# Patient Record
Sex: MALE | Race: OTHER | NOT HISPANIC OR LATINO | URBAN - METROPOLITAN AREA
[De-identification: names, ages, dates, MRNs, and addresses within clinical notes are randomized per-mention and may not be internally consistent; named-entity substitution may affect disease eponyms.]

---

## 2019-04-28 ENCOUNTER — EMERGENCY (EMERGENCY)
Facility: HOSPITAL | Age: 54
LOS: 1 days | Discharge: ANOTHER HOSPITAL | End: 2019-04-28
Attending: EMERGENCY MEDICINE | Admitting: EMERGENCY MEDICINE
Payer: COMMERCIAL

## 2019-04-28 ENCOUNTER — INPATIENT (INPATIENT)
Facility: HOSPITAL | Age: 54
LOS: 1 days | Discharge: HOME | DRG: 247 | End: 2019-04-30
Attending: STUDENT IN AN ORGANIZED HEALTH CARE EDUCATION/TRAINING PROGRAM | Admitting: INTERNAL MEDICINE
Payer: COMMERCIAL

## 2019-04-28 VITALS
SYSTOLIC BLOOD PRESSURE: 185 MMHG | RESPIRATION RATE: 15 BRPM | TEMPERATURE: 98 F | HEIGHT: 65 IN | DIASTOLIC BLOOD PRESSURE: 100 MMHG | OXYGEN SATURATION: 99 % | HEART RATE: 69 BPM | WEIGHT: 184.09 LBS

## 2019-04-28 VITALS
RESPIRATION RATE: 16 BRPM | HEART RATE: 68 BPM | OXYGEN SATURATION: 98 % | SYSTOLIC BLOOD PRESSURE: 176 MMHG | DIASTOLIC BLOOD PRESSURE: 93 MMHG

## 2019-04-28 VITALS
SYSTOLIC BLOOD PRESSURE: 154 MMHG | HEIGHT: 65 IN | WEIGHT: 187.39 LBS | OXYGEN SATURATION: 99 % | HEART RATE: 72 BPM | RESPIRATION RATE: 14 BRPM | DIASTOLIC BLOOD PRESSURE: 75 MMHG

## 2019-04-28 DIAGNOSIS — R07.9 CHEST PAIN, UNSPECIFIED: ICD-10-CM

## 2019-04-28 LAB
ALBUMIN SERPL ELPH-MCNC: 4.2 G/DL — SIGNIFICANT CHANGE UP (ref 3.3–5)
ALBUMIN SERPL ELPH-MCNC: 4.8 G/DL — SIGNIFICANT CHANGE UP (ref 3.3–5)
ALP SERPL-CCNC: 81 U/L — SIGNIFICANT CHANGE UP (ref 40–120)
ALP SERPL-CCNC: 84 U/L — SIGNIFICANT CHANGE UP (ref 30–120)
ALT FLD-CCNC: 125 U/L DA — HIGH (ref 10–60)
ALT FLD-CCNC: 94 U/L — HIGH (ref 10–45)
ANION GAP SERPL CALC-SCNC: 12 MMOL/L — SIGNIFICANT CHANGE UP (ref 5–17)
ANION GAP SERPL CALC-SCNC: 15 MMOL/L — SIGNIFICANT CHANGE UP (ref 5–17)
APTT BLD: 36.4 SEC — SIGNIFICANT CHANGE UP (ref 28.5–37)
APTT BLD: >200 SEC — CRITICAL HIGH (ref 27.5–36.3)
AST SERPL-CCNC: 56 U/L — HIGH (ref 10–40)
AST SERPL-CCNC: 81 U/L — HIGH (ref 10–40)
BASOPHILS # BLD AUTO: 0.1 K/UL — SIGNIFICANT CHANGE UP (ref 0–0.2)
BASOPHILS NFR BLD AUTO: 0.6 % — SIGNIFICANT CHANGE UP (ref 0–2)
BILIRUB SERPL-MCNC: 0.2 MG/DL — SIGNIFICANT CHANGE UP (ref 0.2–1.2)
BILIRUB SERPL-MCNC: 0.3 MG/DL — SIGNIFICANT CHANGE UP (ref 0.2–1.2)
BLD GP AB SCN SERPL QL: NEGATIVE — SIGNIFICANT CHANGE UP
BUN SERPL-MCNC: 10 MG/DL — SIGNIFICANT CHANGE UP (ref 7–23)
BUN SERPL-MCNC: 9 MG/DL — SIGNIFICANT CHANGE UP (ref 7–23)
CALCIUM SERPL-MCNC: 9.6 MG/DL — SIGNIFICANT CHANGE UP (ref 8.4–10.5)
CALCIUM SERPL-MCNC: 9.6 MG/DL — SIGNIFICANT CHANGE UP (ref 8.4–10.5)
CHLORIDE SERPL-SCNC: 101 MMOL/L — SIGNIFICANT CHANGE UP (ref 96–108)
CHLORIDE SERPL-SCNC: 98 MMOL/L — SIGNIFICANT CHANGE UP (ref 96–108)
CHOLEST SERPL-MCNC: 229 MG/DL — HIGH (ref 10–199)
CK MB BLD-MCNC: 0.9 % — SIGNIFICANT CHANGE UP (ref 0–3.5)
CK MB BLD-MCNC: 5.1 % — HIGH (ref 0–3.5)
CK MB BLD-MCNC: 5.4 % — HIGH (ref 0–3.5)
CK MB BLD-MCNC: 6.3 % — HIGH (ref 0–3.5)
CK MB CFR SERPL CALC: 0.9 NG/ML — SIGNIFICANT CHANGE UP (ref 0–3.6)
CK MB CFR SERPL CALC: 25.3 NG/ML — HIGH (ref 0–6.7)
CK MB CFR SERPL CALC: 55.5 NG/ML — HIGH (ref 0–6.7)
CK MB CFR SERPL CALC: 88.6 NG/ML — HIGH (ref 0–6.7)
CK SERPL-CCNC: 1099 U/L — HIGH (ref 30–200)
CK SERPL-CCNC: 1415 U/L — HIGH (ref 30–200)
CK SERPL-CCNC: 469 U/L — HIGH (ref 30–200)
CK SERPL-CCNC: 97 U/L — SIGNIFICANT CHANGE UP (ref 39–308)
CO2 SERPL-SCNC: 23 MMOL/L — SIGNIFICANT CHANGE UP (ref 22–31)
CO2 SERPL-SCNC: 28 MMOL/L — SIGNIFICANT CHANGE UP (ref 22–31)
CREAT SERPL-MCNC: 0.89 MG/DL — SIGNIFICANT CHANGE UP (ref 0.5–1.3)
CREAT SERPL-MCNC: 0.99 MG/DL — SIGNIFICANT CHANGE UP (ref 0.5–1.3)
EOSINOPHIL # BLD AUTO: 0.1 K/UL — SIGNIFICANT CHANGE UP (ref 0–0.5)
EOSINOPHIL NFR BLD AUTO: 0.7 % — SIGNIFICANT CHANGE UP (ref 0–6)
GLUCOSE SERPL-MCNC: 147 MG/DL — HIGH (ref 70–99)
GLUCOSE SERPL-MCNC: 162 MG/DL — HIGH (ref 70–99)
HBA1C BLD-MCNC: 6.7 % — HIGH (ref 4–5.6)
HCT VFR BLD CALC: 42.8 % — SIGNIFICANT CHANGE UP (ref 39–50)
HCT VFR BLD CALC: 45 % — SIGNIFICANT CHANGE UP (ref 39–50)
HDLC SERPL-MCNC: 36 MG/DL — LOW
HGB BLD-MCNC: 14.8 G/DL — SIGNIFICANT CHANGE UP (ref 13–17)
HGB BLD-MCNC: 14.9 G/DL — SIGNIFICANT CHANGE UP (ref 13–17)
INR BLD: 1.14 RATIO — SIGNIFICANT CHANGE UP (ref 0.88–1.16)
INR BLD: 1.17 RATIO — HIGH (ref 0.88–1.16)
LIPID PNL WITH DIRECT LDL SERPL: 177 MG/DL — HIGH
LYMPHOCYTES # BLD AUTO: 31.7 % — SIGNIFICANT CHANGE UP (ref 13–44)
LYMPHOCYTES # BLD AUTO: 4.2 K/UL — HIGH (ref 1–3.3)
MAGNESIUM SERPL-MCNC: 1.9 MG/DL — SIGNIFICANT CHANGE UP (ref 1.6–2.6)
MCHC RBC-ENTMCNC: 28 PG — SIGNIFICANT CHANGE UP (ref 27–34)
MCHC RBC-ENTMCNC: 30 PG — SIGNIFICANT CHANGE UP (ref 27–34)
MCHC RBC-ENTMCNC: 32.9 GM/DL — SIGNIFICANT CHANGE UP (ref 32–36)
MCHC RBC-ENTMCNC: 35 GM/DL — SIGNIFICANT CHANGE UP (ref 32–36)
MCV RBC AUTO: 85.1 FL — SIGNIFICANT CHANGE UP (ref 80–100)
MCV RBC AUTO: 85.7 FL — SIGNIFICANT CHANGE UP (ref 80–100)
MONOCYTES # BLD AUTO: 0.7 K/UL — SIGNIFICANT CHANGE UP (ref 0–0.9)
MONOCYTES NFR BLD AUTO: 5.6 % — SIGNIFICANT CHANGE UP (ref 2–14)
NEUTROPHILS # BLD AUTO: 8.1 K/UL — HIGH (ref 1.8–7.4)
NEUTROPHILS NFR BLD AUTO: 61.3 % — SIGNIFICANT CHANGE UP (ref 43–77)
NRBC # BLD: 0 /100 WBCS — SIGNIFICANT CHANGE UP (ref 0–0)
PHOSPHATE SERPL-MCNC: 2.1 MG/DL — LOW (ref 2.5–4.5)
PLATELET # BLD AUTO: 251 K/UL — SIGNIFICANT CHANGE UP (ref 150–400)
PLATELET # BLD AUTO: 270 K/UL — SIGNIFICANT CHANGE UP (ref 150–400)
POTASSIUM SERPL-MCNC: 3.5 MMOL/L — SIGNIFICANT CHANGE UP (ref 3.5–5.3)
POTASSIUM SERPL-MCNC: 4.1 MMOL/L — SIGNIFICANT CHANGE UP (ref 3.5–5.3)
POTASSIUM SERPL-SCNC: 3.5 MMOL/L — SIGNIFICANT CHANGE UP (ref 3.5–5.3)
POTASSIUM SERPL-SCNC: 4.1 MMOL/L — SIGNIFICANT CHANGE UP (ref 3.5–5.3)
PROT SERPL-MCNC: 7.2 G/DL — SIGNIFICANT CHANGE UP (ref 6–8.3)
PROT SERPL-MCNC: 7.9 G/DL — SIGNIFICANT CHANGE UP (ref 6–8.3)
PROTHROM AB SERPL-ACNC: 12.5 SEC — SIGNIFICANT CHANGE UP (ref 10–12.9)
PROTHROM AB SERPL-ACNC: 13.4 SEC — HIGH (ref 10–12.9)
RBC # BLD: 4.99 M/UL — SIGNIFICANT CHANGE UP (ref 4.2–5.8)
RBC # BLD: 5.29 M/UL — SIGNIFICANT CHANGE UP (ref 4.2–5.8)
RBC # FLD: 12.3 % — SIGNIFICANT CHANGE UP (ref 10.3–14.5)
RBC # FLD: 12.9 % — SIGNIFICANT CHANGE UP (ref 10.3–14.5)
RH IG SCN BLD-IMP: POSITIVE — SIGNIFICANT CHANGE UP
SODIUM SERPL-SCNC: 136 MMOL/L — SIGNIFICANT CHANGE UP (ref 135–145)
SODIUM SERPL-SCNC: 141 MMOL/L — SIGNIFICANT CHANGE UP (ref 135–145)
TOTAL CHOLESTEROL/HDL RATIO MEASUREMENT: 6.4 RATIO — SIGNIFICANT CHANGE UP (ref 3.4–9.6)
TRIGL SERPL-MCNC: 80 MG/DL — SIGNIFICANT CHANGE UP (ref 10–149)
TROPONIN I SERPL-MCNC: 0.01 NG/ML — LOW (ref 0.02–0.06)
TROPONIN T, HIGH SENSITIVITY RESULT: 1777 NG/L — HIGH (ref 0–51)
TROPONIN T, HIGH SENSITIVITY RESULT: 1823 NG/L — HIGH (ref 0–51)
TROPONIN T, HIGH SENSITIVITY RESULT: 222 NG/L — HIGH (ref 0–51)
TSH SERPL-MCNC: 2.12 UIU/ML — SIGNIFICANT CHANGE UP (ref 0.27–4.2)
WBC # BLD: 13.1 K/UL — HIGH (ref 3.8–10.5)
WBC # BLD: 14.94 K/UL — HIGH (ref 3.8–10.5)
WBC # FLD AUTO: 13.1 K/UL — HIGH (ref 3.8–10.5)
WBC # FLD AUTO: 14.94 K/UL — HIGH (ref 3.8–10.5)

## 2019-04-28 PROCEDURE — 71045 X-RAY EXAM CHEST 1 VIEW: CPT | Mod: 26

## 2019-04-28 PROCEDURE — 80053 COMPREHEN METABOLIC PANEL: CPT

## 2019-04-28 PROCEDURE — 85027 COMPLETE CBC AUTOMATED: CPT

## 2019-04-28 PROCEDURE — 85610 PROTHROMBIN TIME: CPT

## 2019-04-28 PROCEDURE — 36415 COLL VENOUS BLD VENIPUNCTURE: CPT

## 2019-04-28 PROCEDURE — 82553 CREATINE MB FRACTION: CPT

## 2019-04-28 PROCEDURE — 99223 1ST HOSP IP/OBS HIGH 75: CPT | Mod: GC

## 2019-04-28 PROCEDURE — 93010 ELECTROCARDIOGRAM REPORT: CPT

## 2019-04-28 PROCEDURE — 99291 CRITICAL CARE FIRST HOUR: CPT

## 2019-04-28 PROCEDURE — 93005 ELECTROCARDIOGRAM TRACING: CPT

## 2019-04-28 PROCEDURE — 85730 THROMBOPLASTIN TIME PARTIAL: CPT

## 2019-04-28 PROCEDURE — 92941 PRQ TRLML REVSC TOT OCCL AMI: CPT | Mod: LC,GC

## 2019-04-28 PROCEDURE — 93306 TTE W/DOPPLER COMPLETE: CPT | Mod: 26

## 2019-04-28 PROCEDURE — 82550 ASSAY OF CK (CPK): CPT

## 2019-04-28 PROCEDURE — 71045 X-RAY EXAM CHEST 1 VIEW: CPT

## 2019-04-28 PROCEDURE — 84484 ASSAY OF TROPONIN QUANT: CPT

## 2019-04-28 PROCEDURE — 99152 MOD SED SAME PHYS/QHP 5/>YRS: CPT | Mod: GC

## 2019-04-28 PROCEDURE — 93458 L HRT ARTERY/VENTRICLE ANGIO: CPT | Mod: 26,59,GC

## 2019-04-28 RX ORDER — CHLORHEXIDINE GLUCONATE 213 G/1000ML
1 SOLUTION TOPICAL
Qty: 0 | Refills: 0 | Status: DISCONTINUED | OUTPATIENT
Start: 2019-04-28 | End: 2019-04-30

## 2019-04-28 RX ORDER — HEPARIN SODIUM 5000 [USP'U]/ML
INJECTION INTRAVENOUS; SUBCUTANEOUS
Qty: 25000 | Refills: 0 | Status: DISCONTINUED | OUTPATIENT
Start: 2019-04-28 | End: 2019-05-02

## 2019-04-28 RX ORDER — ASPIRIN/CALCIUM CARB/MAGNESIUM 324 MG
81 TABLET ORAL DAILY
Qty: 0 | Refills: 0 | Status: DISCONTINUED | OUTPATIENT
Start: 2019-04-28 | End: 2019-04-30

## 2019-04-28 RX ORDER — HEPARIN SODIUM 5000 [USP'U]/ML
4000 INJECTION INTRAVENOUS; SUBCUTANEOUS EVERY 6 HOURS
Qty: 0 | Refills: 0 | Status: DISCONTINUED | OUTPATIENT
Start: 2019-04-28 | End: 2019-05-02

## 2019-04-28 RX ORDER — MAGNESIUM SULFATE 500 MG/ML
2 VIAL (ML) INJECTION ONCE
Qty: 0 | Refills: 0 | Status: COMPLETED | OUTPATIENT
Start: 2019-04-28 | End: 2019-04-28

## 2019-04-28 RX ORDER — CHLORHEXIDINE GLUCONATE 213 G/1000ML
1 SOLUTION TOPICAL DAILY
Qty: 0 | Refills: 0 | Status: DISCONTINUED | OUTPATIENT
Start: 2019-04-28 | End: 2019-04-30

## 2019-04-28 RX ORDER — METOPROLOL TARTRATE 50 MG
25 TABLET ORAL
Qty: 0 | Refills: 0 | Status: DISCONTINUED | OUTPATIENT
Start: 2019-04-28 | End: 2019-04-29

## 2019-04-28 RX ORDER — ASPIRIN/CALCIUM CARB/MAGNESIUM 324 MG
1 TABLET ORAL
Qty: 0 | Refills: 0 | COMMUNITY

## 2019-04-28 RX ORDER — TICAGRELOR 90 MG/1
180 TABLET ORAL ONCE
Qty: 0 | Refills: 0 | Status: COMPLETED | OUTPATIENT
Start: 2019-04-28 | End: 2019-04-28

## 2019-04-28 RX ORDER — TICAGRELOR 90 MG/1
90 TABLET ORAL
Qty: 0 | Refills: 0 | Status: DISCONTINUED | OUTPATIENT
Start: 2019-04-28 | End: 2019-04-30

## 2019-04-28 RX ORDER — HEPARIN SODIUM 5000 [USP'U]/ML
4000 INJECTION INTRAVENOUS; SUBCUTANEOUS ONCE
Qty: 0 | Refills: 0 | Status: COMPLETED | OUTPATIENT
Start: 2019-04-28 | End: 2019-04-28

## 2019-04-28 RX ORDER — HEPARIN SODIUM 5000 [USP'U]/ML
5000 INJECTION INTRAVENOUS; SUBCUTANEOUS EVERY 8 HOURS
Qty: 0 | Refills: 0 | Status: DISCONTINUED | OUTPATIENT
Start: 2019-04-28 | End: 2019-04-30

## 2019-04-28 RX ORDER — POTASSIUM PHOSPHATE, MONOBASIC POTASSIUM PHOSPHATE, DIBASIC 236; 224 MG/ML; MG/ML
15 INJECTION, SOLUTION INTRAVENOUS ONCE
Qty: 0 | Refills: 0 | Status: COMPLETED | OUTPATIENT
Start: 2019-04-28 | End: 2019-04-28

## 2019-04-28 RX ORDER — ATORVASTATIN CALCIUM 80 MG/1
80 TABLET, FILM COATED ORAL AT BEDTIME
Qty: 0 | Refills: 0 | Status: DISCONTINUED | OUTPATIENT
Start: 2019-04-28 | End: 2019-04-30

## 2019-04-28 RX ORDER — LANOLIN ALCOHOL/MO/W.PET/CERES
5 CREAM (GRAM) TOPICAL AT BEDTIME
Qty: 0 | Refills: 0 | Status: DISCONTINUED | OUTPATIENT
Start: 2019-04-28 | End: 2019-04-30

## 2019-04-28 RX ADMIN — Medication 81 MILLIGRAM(S): at 12:51

## 2019-04-28 RX ADMIN — TICAGRELOR 180 MILLIGRAM(S): 90 TABLET ORAL at 02:45

## 2019-04-28 RX ADMIN — HEPARIN SODIUM 1000 UNIT(S)/HR: 5000 INJECTION INTRAVENOUS; SUBCUTANEOUS at 03:03

## 2019-04-28 RX ADMIN — HEPARIN SODIUM 5000 UNIT(S): 5000 INJECTION INTRAVENOUS; SUBCUTANEOUS at 21:07

## 2019-04-28 RX ADMIN — Medication 50 GRAM(S): at 07:29

## 2019-04-28 RX ADMIN — Medication 5 MILLIGRAM(S): at 21:49

## 2019-04-28 RX ADMIN — POTASSIUM PHOSPHATE, MONOBASIC POTASSIUM PHOSPHATE, DIBASIC 62.5 MILLIMOLE(S): 236; 224 INJECTION, SOLUTION INTRAVENOUS at 08:12

## 2019-04-28 RX ADMIN — ATORVASTATIN CALCIUM 80 MILLIGRAM(S): 80 TABLET, FILM COATED ORAL at 21:07

## 2019-04-28 RX ADMIN — Medication 10 MILLIGRAM(S): at 05:56

## 2019-04-28 RX ADMIN — HEPARIN SODIUM 5000 UNIT(S): 5000 INJECTION INTRAVENOUS; SUBCUTANEOUS at 12:51

## 2019-04-28 RX ADMIN — Medication 25 MILLIGRAM(S): at 17:50

## 2019-04-28 RX ADMIN — Medication 25 MILLIGRAM(S): at 05:57

## 2019-04-28 RX ADMIN — HEPARIN SODIUM 4000 UNIT(S): 5000 INJECTION INTRAVENOUS; SUBCUTANEOUS at 03:03

## 2019-04-28 RX ADMIN — Medication 10 MILLIGRAM(S): at 15:40

## 2019-04-28 RX ADMIN — TICAGRELOR 90 MILLIGRAM(S): 90 TABLET ORAL at 17:50

## 2019-04-28 NOTE — ED PROVIDER NOTE - OBJECTIVE STATEMENT
Patient c/o 2 hours of left sided chest pain. Some sweating, no SOB. No dizziness/palpitations. No history of same pain. Patient took 325 of ASA 1.5 hours ago

## 2019-04-28 NOTE — CHART NOTE - NSCHARTNOTEFT_GEN_A_CORE
CCU Transfer Note    Transfer from: CCU    Transfer to: (  ) Medicine    (  ) Telemetry     (   ) RCU        (    ) Palliative         (   ) Stroke Unit          (   ) __________________    Accepting Physician:  Signout given to:     HPI and CCU COURSE:  This is a 52 y/o M w/ a PMH significant for HTN and HLD who initially presented to the [place] ED w/ chest pain and tightness x2 hours. He had just gone out to dinner with his wife the night prior, but denies feeling or doing anything out of the ordinary. Otherwise, he has been in his usual state of health. The pain was left-sided, associated w/ diaphoresis, but denies dyspnea, palpitations. He took 325 mg ASA at home, and since he was still feeling the chest discomfort, he decided to come to the ED. His EKG showed ST elevations in leads II, III, and aVF, so he was transferred to [facility] for LHC. Found to have 99% lesion in OM2, s/p MARY placement. Also found to have 50% mLAD lesion. At time of arrival to CCU, patient was hemodynamically stable. TTE 4/28/19:  Mild segmental left ventricular systolic dysfunction. The basal -mid inferolateral wall and the inferior walll are hypokinetic.  Endocardial visualization enhanced with intravenous injection of Ultrasonic Enhancing Agent (Definity). No left ventricular thrombus. Patient was restarted on home medications and ready for downgrade in level of care with outpatient follow up.    ASSESSMENT & PLAN:   52 y/o M w/ a PMH significant for HTN and HLD who is admitted w/ STEMI s/p PCI/MARY to [facility].     #Neuro  - no acute/active issues, mentating at baseline AOx3    #CV  - s/p PCI to 2  - c/w ASA, brilinta, lipitor  - on metoprolol succinate 100 mg daily at home; will start metoprolol tartrate 25 mg BID to assess tolerance and then uptitrate  - c/w enalapril 20mg daily  - TTE with hypokinesis of inferior and inferolateral wall, mild LV systolic dysfuntion.   - f/u lipid profile    #Resp  - no acute/active issues, saturating well on RA, not tachypneic    #GI  - no acute/active issues, DASH/TLC diet    #Renal  - admission SCr at [facility] 0.99  - trend BMP; CrCl appears wnl, monitor for YUNI    #ID  - no acute/active issues    #Endo  - A1C 6.7, needs outpatient follow up    #DVT ppx: [facility]            FOR FOLLOW UP:  -c/w ASA, brilinta, lipitor, enalapril  -uptitrate metoprolol to home dose  -outpatient follow up with cardiology  -outpatient follow up with PCP for elevated A1C

## 2019-04-28 NOTE — ED PROVIDER NOTE - CHPI ED SYMPTOMS NEG
no dizziness/no fever/no nausea/no shortness of breath/no vomiting/no chills/no back pain/no cough/no syncope

## 2019-04-28 NOTE — H&P ADULT - ASSESSMENT
54 y/o M w/ a PMH significant for HTN and HLD who is admitted w/ STEMI s/p PCI to 2.     #Neuro  - no acute/active issues, mentating at baseline AOx3    #CV  - s/p PCI to 2  - c/w ASA, brilinta, lipitor  - on metoprolol succinate 100 mg daily at home; will start metoprolol tartrate 25 mg BID to assess tolerance and then uptitrate  - for his h/o HTN, he is on enalapril 20 mg daily; will start at half dose and also uptitrate as tolerated  - TTE ordered for AM  - f/u lipid profile    #Resp  - no acute/active issues, saturating well on RA, not tachypneic    #GI  - no acute/active issues, DASH/TLC diet    #Renal  - admission SCr at [facility] 0.99  - trend BMP; CrCl appears wnl, monitor for YUNI    #ID  - no acute/active issues    #Endo  - f/u A1c, TSH    #DVT ppx: [facility]    Arely Harvey MD  PGY-2, Internal Medicine  [phone]

## 2019-04-28 NOTE — H&P ADULT - NSHPPHYSICALEXAM_GEN_ALL_CORE
Vital Signs Last 24 Hrs  T(C): 36.7 (28 Apr 2019 02:32), Max: 36.7 (28 Apr 2019 02:32)  T(F): 98.1 (28 Apr 2019 02:32), Max: 98.1 (28 Apr 2019 02:32)  HR: 68 (28 Apr 2019 03:04) (65 - 69)  BP: 176/93 (28 Apr 2019 03:04) (176/93 - 185/100)  BP(mean): 114 (28 Apr 2019 03:04) (106 - 114)  RR: 16 (28 Apr 2019 03:04) (15 - 16)  SpO2: 98% (28 Apr 2019 03:04) (98% - 99%)    PHYSICAL EXAM:   GENERAL: no acute distress  HEENT: NC/AT, EOMI, neck supple, MMM  RESPIRATORY: LCTAB/L, no rhonchi, rales, or wheezing  CARDIOVASCULAR: RRR, no murmurs, gallops, rubs  ABDOMINAL: soft, non-tender, non-distended, positive bowel sounds   EXTREMITIES: no clubbing, cyanosis, or edema  NEUROLOGICAL: alert and oriented x 3, non-focal  SKIN: no rashes or lesions   PSYCH: normal affect, linear thought process  MUSCULOSKELETAL: no gross joint deformity

## 2019-04-28 NOTE — CHART NOTE - NSCHARTNOTEFT_GEN_A_CORE
Removal of Radial Band    Pulses in the right upper extremity are palpable. The patient was placed in the supine position. The insertion site was identified and the band deflated per protocol. The radial band was removed slowly. Direct pressure was not applicable.      Monitoring of the right wrists and both upper extremities including neuro-vascular checks and vital signs every 15 minutes x 4.    Complications: None

## 2019-04-28 NOTE — ED ADULT NURSE NOTE - NSIMPLEMENTINTERV_GEN_ALL_ED
Marisela Scott(Attending) Implemented All Universal Safety Interventions:  [place] to call system. Call bell, personal items and telephone within reach. Instruct patient to call for assistance. Room bathroom lighting operational. Non-slip footwear when patient is off stretcher. Physically safe environment: no spills, clutter or unnecessary equipment. Stretcher in lowest position, wheels locked, appropriate side rails in place.

## 2019-04-28 NOTE — ED PROVIDER NOTE - NSRISKOFTRANSFER_ED_A_ED
Transportation Risk (There is always a risk of traffic delays resulting in deterioration of condition.)/Death or Disability/Deterioration of Condition En Route/Increased Pain

## 2019-04-28 NOTE — H&P ADULT - HISTORY OF PRESENT ILLNESS
This is a 54 y/o M w/ a PMH significant for HTN and HLD who initially presented to the [place] ED w/ chest pain x2 hours. The pain was left-sided, associated w/ diaphoresis, but denies dyspnea, palpitations. His EKG showed ST elevations in leads II, III, and aVF, so he was transferred to [facility] for LHC. Found to have 99% lesion in OM2, which was intervened on. Also found to have 50% mLAD lesion. At time of arrival to CCU, patient denies any further chest pain, no n/v, lightheadedness, palpitations, or dyspnea. This is a 54 y/o M w/ a PMH significant for HTN and HLD who initially presented to the [place] ED w/ chest pain and tightness x2 hours. He had just gone out to dinner with his wife the night prior, but denies feeling or doing anything out of the ordinary. Otherwise, he has been in his usual state of health. The pain was left-sided, associated w/ diaphoresis, but denies dyspnea, palpitations. He took 325 mg ASA at home, and since he was still feeling the chest discomfort, he decided to come to the ED. His EKG showed ST elevations in leads II, III, and aVF, so he was transferred to [facility] for LHC. Found to have 99% lesion in OM2, which was intervened on. Also found to have 50% mLAD lesion. At time of arrival to CCU, patient denies any further chest pain, no n/v, lightheadedness, palpitations, or dyspnea.

## 2019-04-28 NOTE — H&P ADULT - ATTENDING COMMENTS
Patient presented with inferior wall STEMI, now status post PCI.    Continue dual antiplatelet therapy, high intensity statin, beta-blocker, and ACEi.    I have personally provided 30 minutes of critical care time concurrently with resident/fellow and excludes time spent on separate procedures.  I have reviewed the resident and fellow's documentation and I agree with the resident and fellow's assessments and plans of care.

## 2019-04-28 NOTE — PROGRESS NOTE ADULT - SUBJECTIVE AND OBJECTIVE BOX
====================  CCU MIDNIGHT ROUNDS  ====================    JUANCARLOS BOLA  [id number]    Patient is a 53y old  Male who presents with a chief complaint of chest pain (28 Apr 2019 04:48)    ====================  SUMMARY: 52 y/o M w/ a PMH significant for HTN and HLD who is admitted w/ STEMI s/p PCI/MARY to OM2.   ====================    ====================  NEW EVENTS: No acute events.  TTE revealed EF 60%, mild segmental LVSD, hypokinetic inferior wall + basal-mid inferolateral walls.  Denies chest pain or palpitations.  Remains hemodynamically stable.   ====================    MEDICATIONS  (STANDING):  aspirin enteric coated 81 milliGRAM(s) Oral daily  atorvastatin 80 milliGRAM(s) Oral at bedtime  chlorhexidine 2% Cloths 1 Application(s) Topical daily  chlorhexidine 4% Liquid 1 Application(s) Topical <User Schedule>  enalapril 20 milliGRAM(s) Oral daily  heparin  Injectable 5000 Unit(s) SubCutaneous every 8 hours  melatonin 5 milliGRAM(s) Oral at bedtime  metoprolol tartrate 25 milliGRAM(s) Oral two times a day  ticagrelor 90 milliGRAM(s) Oral two times a day    ====================  VITALS (Last 12 hrs):  ====================  T(C): 36.9 (04-28-19 @ 20:00), Max: 37.2 (04-28-19 @ 19:00)  T(F): 98.4 (04-28-19 @ 20:00), Max: 99 (04-28-19 @ 19:00)  HR: 69 (04-28-19 @ 22:00) (64 - 82)  BP: 143/72 (04-28-19 @ 22:00) (124/78 - 155/85)  BP(mean): 101 (04-28-19 @ 22:00) (88 - 113)  RR: 19 (04-28-19 @ 22:00) (13 - 25)  SpO2: 98% (04-28-19 @ 22:00) (97% - 99%)      I&O's Summary    27 Apr 2019 07:01  -  28 Apr 2019 07:00  --------------------------------------------------------  IN: 200 mL / OUT: 800 mL / NET: -600 mL    28 Apr 2019 07:01  -  28 Apr 2019 22:25  --------------------------------------------------------  IN: 600 mL / OUT: 2000 mL / NET: -1400 mL      ====================  NEW LABS:  ====================  04-28    136  |  98  |  9   ----------------------------<  162<H>  4.1   |  23  |  0.89    Ca    9.6      28 Apr 2019 05:20  Phos  2.1     04-28  Mg     1.9     04-28    TPro  7.2  /  Alb  4.8  /  TBili  0.3  /  DBili  x   /  AST  81<H>  /  ALT  94<H>  /  AlkPhos  81  04-28    PT/INR - ( 28 Apr 2019 05:20 )   PT: 13.4 sec;   INR: 1.17 ratio      PTT - ( 28 Apr 2019 05:20 )  PTT:>200.0 sec  Creatine Kinase, Serum: 1099 U/L <H> (04-28-19 @ 19:04)  Creatine Kinase, Serum: 1415 U/L <H> (04-28-19 @ 13:36)  Creatine Kinase, Serum: 469 U/L <H> (04-28-19 @ 05:20)  Creatine Kinase, Serum: 97 U/L (04-28-19 @ 03:10)    CKMB Units: 55.5 ng/mL (04-28 @ 19:04)  CKMB Units: 88.6 ng/mL (04-28 @ 13:36)  CKMB Units: 25.3 ng/mL (04-28 @ 05:20)  CKMB Units: 0.9 ng/mL (04-28 @ 03:10)    ====================  PLAN:  ====================  #IWSTEMI s/p MARY to OM2  - DAPT & High intensity statin   - Metoprolol 25mg po bid   - Enalapril 20mg po daily   - Trend RIK Trevino CCU NP  Beeper #[id number]  Spectra # [phone] ====================  CCU MIDNIGHT ROUNDS  ====================    JUANCARLOS BOLA  [id number]    Patient is a 53y old  Male who presents with a chief complaint of chest pain (28 Apr 2019 04:48)    ====================  SUMMARY: 52 y/o M w/ a PMH significant for HTN and HLD who is admitted w/ STEMI s/p PCI/MARY to OM2.   ====================    ====================  NEW EVENTS: No acute events.  TTE revealed EF 60%, mild segmental LVSD, hypokinetic inferior wall + basal-mid inferolateral walls.  Denies chest pain or palpitations.  Remains hemodynamically stable.  Right radial artery site is soft, no hematoma or swelling noted.   ====================    MEDICATIONS  (STANDING):  aspirin enteric coated 81 milliGRAM(s) Oral daily  atorvastatin 80 milliGRAM(s) Oral at bedtime  chlorhexidine 2% Cloths 1 Application(s) Topical daily  chlorhexidine 4% Liquid 1 Application(s) Topical <User Schedule>  enalapril 20 milliGRAM(s) Oral daily  heparin  Injectable 5000 Unit(s) SubCutaneous every 8 hours  melatonin 5 milliGRAM(s) Oral at bedtime  metoprolol tartrate 25 milliGRAM(s) Oral two times a day  ticagrelor 90 milliGRAM(s) Oral two times a day    ====================  VITALS (Last 12 hrs):  ====================  T(C): 36.9 (04-28-19 @ 20:00), Max: 37.2 (04-28-19 @ 19:00)  T(F): 98.4 (04-28-19 @ 20:00), Max: 99 (04-28-19 @ 19:00)  HR: 69 (04-28-19 @ 22:00) (64 - 82)  BP: 143/72 (04-28-19 @ 22:00) (124/78 - 155/85)  BP(mean): 101 (04-28-19 @ 22:00) (88 - 113)  RR: 19 (04-28-19 @ 22:00) (13 - 25)  SpO2: 98% (04-28-19 @ 22:00) (97% - 99%)      I&O's Summary    27 Apr 2019 07:01  -  28 Apr 2019 07:00  --------------------------------------------------------  IN: 200 mL / OUT: 800 mL / NET: -600 mL    28 Apr 2019 07:01  -  28 Apr 2019 22:25  --------------------------------------------------------  IN: 600 mL / OUT: 2000 mL / NET: -1400 mL      ====================  NEW LABS:  ====================  04-28    136  |  98  |  9   ----------------------------<  162<H>  4.1   |  23  |  0.89    Ca    9.6      28 Apr 2019 05:20  Phos  2.1     04-28  Mg     1.9     04-28    TPro  7.2  /  Alb  4.8  /  TBili  0.3  /  DBili  x   /  AST  81<H>  /  ALT  94<H>  /  AlkPhos  81  04-28    PT/INR - ( 28 Apr 2019 05:20 )   PT: 13.4 sec;   INR: 1.17 ratio      PTT - ( 28 Apr 2019 05:20 )  PTT:>200.0 sec  Creatine Kinase, Serum: 1099 U/L <H> (04-28-19 @ 19:04)  Creatine Kinase, Serum: 1415 U/L <H> (04-28-19 @ 13:36)  Creatine Kinase, Serum: 469 U/L <H> (04-28-19 @ 05:20)  Creatine Kinase, Serum: 97 U/L (04-28-19 @ 03:10)    CKMB Units: 55.5 ng/mL (04-28 @ 19:04)  CKMB Units: 88.6 ng/mL (04-28 @ 13:36)  CKMB Units: 25.3 ng/mL (04-28 @ 05:20)  CKMB Units: 0.9 ng/mL (04-28 @ 03:10)    ====================  PLAN:  ====================  #IWSTEMI s/p MARY to OM2  - DAPT & High intensity statin   - Metoprolol 25mg po bid   - Enalapril 20mg po daily   - Right radial site stable   - Trend CE      Marycruz Trevino CCU NP  Beeper #[id number]  Spectra # [phone]

## 2019-04-28 NOTE — H&P ADULT - NSHPSOCIALHISTORY_GEN_ALL_CORE
Patient lives at home w/ family. Independent of ADLs. Denies any tobacco or illicit drug use. Drinks 60 ml whisky daily. Patient lives at home w/ family. Independent of ADLs. Denies any tobacco or illicit drug use. Drinks  ml whisky daily.

## 2019-04-29 DIAGNOSIS — E11.9 TYPE 2 DIABETES MELLITUS WITHOUT COMPLICATIONS: ICD-10-CM

## 2019-04-29 DIAGNOSIS — I10 ESSENTIAL (PRIMARY) HYPERTENSION: ICD-10-CM

## 2019-04-29 DIAGNOSIS — I21.19 ST ELEVATION (STEMI) MYOCARDIAL INFARCTION INVOLVING OTHER CORONARY ARTERY OF INFERIOR WALL: ICD-10-CM

## 2019-04-29 LAB
ALBUMIN SERPL ELPH-MCNC: 4.2 G/DL — SIGNIFICANT CHANGE UP (ref 3.3–5)
ALP SERPL-CCNC: 73 U/L — SIGNIFICANT CHANGE UP (ref 40–120)
ALT FLD-CCNC: 97 U/L — HIGH (ref 10–45)
ANION GAP SERPL CALC-SCNC: 13 MMOL/L — SIGNIFICANT CHANGE UP (ref 5–17)
AST SERPL-CCNC: 122 U/L — HIGH (ref 10–40)
BASOPHILS # BLD AUTO: 0.1 K/UL — SIGNIFICANT CHANGE UP (ref 0–0.2)
BASOPHILS NFR BLD AUTO: 1 % — SIGNIFICANT CHANGE UP (ref 0–2)
BILIRUB SERPL-MCNC: 0.5 MG/DL — SIGNIFICANT CHANGE UP (ref 0.2–1.2)
BUN SERPL-MCNC: 8 MG/DL — SIGNIFICANT CHANGE UP (ref 7–23)
CALCIUM SERPL-MCNC: 9.3 MG/DL — SIGNIFICANT CHANGE UP (ref 8.4–10.5)
CHLORIDE SERPL-SCNC: 100 MMOL/L — SIGNIFICANT CHANGE UP (ref 96–108)
CK MB BLD-MCNC: 3.5 % — SIGNIFICANT CHANGE UP (ref 0–3.5)
CK MB CFR SERPL CALC: 22.7 NG/ML — HIGH (ref 0–6.7)
CK SERPL-CCNC: 651 U/L — HIGH (ref 30–200)
CO2 SERPL-SCNC: 24 MMOL/L — SIGNIFICANT CHANGE UP (ref 22–31)
CREAT SERPL-MCNC: 0.88 MG/DL — SIGNIFICANT CHANGE UP (ref 0.5–1.3)
EOSINOPHIL # BLD AUTO: 0.2 K/UL — SIGNIFICANT CHANGE UP (ref 0–0.5)
GLUCOSE SERPL-MCNC: 137 MG/DL — HIGH (ref 70–99)
HCT VFR BLD CALC: 45.4 % — SIGNIFICANT CHANGE UP (ref 39–50)
HCV AB S/CO SERPL IA: 0.06 S/CO — SIGNIFICANT CHANGE UP (ref 0–0.99)
HCV AB SERPL-IMP: SIGNIFICANT CHANGE UP
HGB BLD-MCNC: 15.2 G/DL — SIGNIFICANT CHANGE UP (ref 13–17)
LYMPHOCYTES # BLD AUTO: 5.5 K/UL — HIGH (ref 1–3.3)
LYMPHOCYTES # BLD AUTO: 50 % — HIGH (ref 13–44)
MAGNESIUM SERPL-MCNC: 2.3 MG/DL — SIGNIFICANT CHANGE UP (ref 1.6–2.6)
MCHC RBC-ENTMCNC: 28.4 PG — SIGNIFICANT CHANGE UP (ref 27–34)
MCHC RBC-ENTMCNC: 33.4 GM/DL — SIGNIFICANT CHANGE UP (ref 32–36)
MCV RBC AUTO: 84.9 FL — SIGNIFICANT CHANGE UP (ref 80–100)
MONOCYTES # BLD AUTO: 1.1 K/UL — HIGH (ref 0–0.9)
MONOCYTES NFR BLD AUTO: 8 % — SIGNIFICANT CHANGE UP (ref 2–14)
NEUTROPHILS # BLD AUTO: 5.2 K/UL — SIGNIFICANT CHANGE UP (ref 1.8–7.4)
NEUTROPHILS NFR BLD AUTO: 41 % — LOW (ref 43–77)
PHOSPHATE SERPL-MCNC: 3.1 MG/DL — SIGNIFICANT CHANGE UP (ref 2.5–4.5)
PLATELET # BLD AUTO: 290 K/UL — SIGNIFICANT CHANGE UP (ref 150–400)
POTASSIUM SERPL-MCNC: 4.1 MMOL/L — SIGNIFICANT CHANGE UP (ref 3.5–5.3)
POTASSIUM SERPL-SCNC: 4.1 MMOL/L — SIGNIFICANT CHANGE UP (ref 3.5–5.3)
PROT SERPL-MCNC: 7.1 G/DL — SIGNIFICANT CHANGE UP (ref 6–8.3)
RBC # BLD: 5.35 M/UL — SIGNIFICANT CHANGE UP (ref 4.2–5.8)
RBC # FLD: 12.3 % — SIGNIFICANT CHANGE UP (ref 10.3–14.5)
SODIUM SERPL-SCNC: 137 MMOL/L — SIGNIFICANT CHANGE UP (ref 135–145)
TROPONIN T, HIGH SENSITIVITY RESULT: 1003 NG/L — HIGH (ref 0–51)
WBC # BLD: 12.2 K/UL — HIGH (ref 3.8–10.5)
WBC # FLD AUTO: 12.2 K/UL — HIGH (ref 3.8–10.5)

## 2019-04-29 PROCEDURE — 93010 ELECTROCARDIOGRAM REPORT: CPT

## 2019-04-29 PROCEDURE — 99233 SBSQ HOSP IP/OBS HIGH 50: CPT | Mod: GC

## 2019-04-29 RX ORDER — METOPROLOL TARTRATE 50 MG
50 TABLET ORAL
Qty: 0 | Refills: 0 | Status: DISCONTINUED | OUTPATIENT
Start: 2019-04-29 | End: 2019-04-29

## 2019-04-29 RX ORDER — METOPROLOL TARTRATE 50 MG
50 TABLET ORAL
Qty: 0 | Refills: 0 | Status: DISCONTINUED | OUTPATIENT
Start: 2019-04-29 | End: 2019-04-30

## 2019-04-29 RX ORDER — METOPROLOL TARTRATE 50 MG
25 TABLET ORAL ONCE
Qty: 0 | Refills: 0 | Status: COMPLETED | OUTPATIENT
Start: 2019-04-29 | End: 2019-04-29

## 2019-04-29 RX ADMIN — HEPARIN SODIUM 5000 UNIT(S): 5000 INJECTION INTRAVENOUS; SUBCUTANEOUS at 05:41

## 2019-04-29 RX ADMIN — TICAGRELOR 90 MILLIGRAM(S): 90 TABLET ORAL at 05:37

## 2019-04-29 RX ADMIN — HEPARIN SODIUM 5000 UNIT(S): 5000 INJECTION INTRAVENOUS; SUBCUTANEOUS at 15:09

## 2019-04-29 RX ADMIN — TICAGRELOR 90 MILLIGRAM(S): 90 TABLET ORAL at 17:23

## 2019-04-29 RX ADMIN — ATORVASTATIN CALCIUM 80 MILLIGRAM(S): 80 TABLET, FILM COATED ORAL at 21:15

## 2019-04-29 RX ADMIN — Medication 50 MILLIGRAM(S): at 21:15

## 2019-04-29 RX ADMIN — Medication 25 MILLIGRAM(S): at 17:23

## 2019-04-29 RX ADMIN — HEPARIN SODIUM 5000 UNIT(S): 5000 INJECTION INTRAVENOUS; SUBCUTANEOUS at 21:15

## 2019-04-29 RX ADMIN — Medication 5 MILLIGRAM(S): at 21:15

## 2019-04-29 RX ADMIN — Medication 81 MILLIGRAM(S): at 11:12

## 2019-04-29 RX ADMIN — Medication 25 MILLIGRAM(S): at 18:43

## 2019-04-29 RX ADMIN — CHLORHEXIDINE GLUCONATE 1 APPLICATION(S): 213 SOLUTION TOPICAL at 06:35

## 2019-04-29 RX ADMIN — Medication 20 MILLIGRAM(S): at 21:15

## 2019-04-29 RX ADMIN — Medication 20 MILLIGRAM(S): at 05:37

## 2019-04-29 RX ADMIN — Medication 25 MILLIGRAM(S): at 05:37

## 2019-04-29 NOTE — PROGRESS NOTE ADULT - ASSESSMENT
52 y/o M w/ a PMH significant for HTN and HLD who is admitted w/ STEMI s/p PCI/MARY to OM2.     Neuro  - no issues, AOx3    CV - IWSTEMI  - s/p PCI MARY to OM2  - c/w ASA, brilinta, lipitor  - on metoprolol tartrate 25 mg BID   - c/w enalapril 20mg daily  - monitor access site  - CE down trended  - increase activity    Resp  - no issues, SpO2 95-98 on RA    GI  - no acute/active issues, DASH/TLC Ovo vegan, consistent carbs diet    Renal  - no issues creat stable 0.88    ID  - no  issues    Endo  - A1C 6.7, will need outpatient follow up  - glucose stable 137      DVT ppx: HSQ 54 y/o M w/ a PMH significant for HTN and HLD who is admitted w/ STEMI s/p PCI/MARY to OM2.     Neuro  - no issues, AOx3    CV - IWSTEMI  - s/p PCI MARY to OM2  - c/w ASA, brilinta, lipitor  - on metoprolol tartrate 25 mg BID   - c/w enalapril 20mg daily  - monitor access site  - CE down trended  - increase activity    Resp  - no issues, SpO2 95-98 on RA    GI  - no acute/active issues, DASH/TLC Ovo vegan, consistent carbs diet    Renal  - no issues creat stable 0.88    ID  - no  issues    Endo  - A1C 6.7, will need outpatient follow up  - glucose stable 137  - diabetes education       DVT ppx: HSQ

## 2019-04-29 NOTE — PROGRESS NOTE ADULT - SUBJECTIVE AND OBJECTIVE BOX
====================  CCU MIDNIGHT ROUNDS  ====================    JUANCARLOS PLAZA  [id number]    Patient is a 53y old  Male who presents with a chief complaint of chest pain (29 Apr 2019 07:19)    ====================  SUMMARY: 54 y/o M w/ a PMH significant for HTN and HLD who is admitted w/ STEMI s/p PCI/MARY to OM2.  ====================    ====================  NEW EVENTS: No acute events   ====================    MEDICATIONS  (STANDING):  aspirin enteric coated 81 milliGRAM(s) Oral daily  atorvastatin 80 milliGRAM(s) Oral at bedtime  chlorhexidine 2% Cloths 1 Application(s) Topical daily  chlorhexidine 4% Liquid 1 Application(s) Topical <User Schedule>  heparin  Injectable 5000 Unit(s) SubCutaneous every 8 hours  melatonin 5 milliGRAM(s) Oral at bedtime  metoprolol tartrate 50 milliGRAM(s) Oral two times a day  ticagrelor 90 milliGRAM(s) Oral two times a day    ====================  VITALS (Last 12 hrs):  ====================  T(C): 36.7 (04-29-19 @ 21:00), Max: 36.7 (04-29-19 @ 15:00)  T(F): 98.1 (04-29-19 @ 21:00), Max: 98.1 (04-29-19 @ 15:00)  HR: 65 (04-29-19 @ 23:00) (65 - 85)  BP: 143/75 (04-29-19 @ 23:00) (124/71 - 186/91)  BP(mean): 102 (04-29-19 @ 23:00) (90 - 130)  RR: 16 (04-29-19 @ 23:00) (16 - 24)  SpO2: 97% (04-29-19 @ 19:00) (97% - 99%)      I&O's Summary    28 Apr 2019 07:01  -  29 Apr 2019 07:00  --------------------------------------------------------  IN: 720 mL / OUT: 2650 mL / NET: -1930 mL    29 Apr 2019 07:01  -  29 Apr 2019 23:56  --------------------------------------------------------  IN: 240 mL / OUT: 800 mL / NET: -560 mL        ====================  NEW LABS:  ====================  04-29    137  |  100  |  8   ----------------------------<  137<H>  4.1   |  24  |  0.88    Ca    9.3      29 Apr 2019 05:45  Phos  3.1     04-29  Mg     2.3     04-29    TPro  7.1  /  Alb  4.2  /  TBili  0.5  /  DBili  x   /  AST  122<H>  /  ALT  97<H>  /  AlkPhos  73  04-29    PT/INR - ( 28 Apr 2019 05:20 )   PT: 13.4 sec;   INR: 1.17 ratio      PTT - ( 28 Apr 2019 05:20 )  PTT:>200.0 sec  Creatine Kinase, Serum: 651 U/L <H> (04-29-19 @ 05:45)    CKMB Units: 22.7 ng/mL (04-29 @ 05:45)    ====================  PLAN:  ====================  #IWSTEMI s/p MARY to OM2  - DAPT & High intensity statin   - Metoprolol 50mg po bid   - Enalapril 40mg po daily   - Right radial site stable   - CE trended down   - DC planning       Marycruz Trevino CCU NP  Beeper #[id number]  Spectra # [phone]

## 2019-04-29 NOTE — PROGRESS NOTE ADULT - SUBJECTIVE AND OBJECTIVE BOX
Admission date: April 28th  CHIEF COMPLAINT:  HPI: 54 y/o M w/ a PMH significant for HTN and HLD who initially presented to the [place] ED w/ chest pain and tightness x2 hours. He had just gone out to dinner with his wife the night prior, but denies feeling or doing anything out of the ordinary. Otherwise, he has been in his usual state of health. The pain was left-sided, associated w/ diaphoresis, but denies dyspnea, palpitations. He took 325 mg ASA at home, and since he was still feeling the chest discomfort, he decided to come to the ED. His EKG showed ST elevations in leads II, III, and aVF, so he was transferred to [facility] for LHC. Found to have 99% lesion in OM2, which was intervened on. Also found to have 50% mLAD lesion. At time of arrival to CCU, patient denies any further chest pain, no n/v, lightheadedness, palpitations, or dyspnea. (28 Apr 2019 04:48)     INTERVAL HISTORY: S/P PCI    REVIEW OF SYSTEMS: Denies ; all others negative    MEDICATIONS  (STANDING):  aspirin enteric coated 81 milliGRAM(s) Oral daily  atorvastatin 80 milliGRAM(s) Oral at bedtime  chlorhexidine 2% Cloths 1 Application(s) Topical daily  chlorhexidine 4% Liquid 1 Application(s) Topical <User Schedule>  enalapril 20 milliGRAM(s) Oral daily  heparin  Injectable 5000 Unit(s) SubCutaneous every 8 hours  melatonin 5 milliGRAM(s) Oral at bedtime  metoprolol tartrate 25 milliGRAM(s) Oral two times a day  ticagrelor 90 milliGRAM(s) Oral two times a day    MEDICATIONS  (PRN):      Objective:  ICU Vital Signs Last 24 Hrs  T(C): 36.9 (28 Apr 2019 20:00), Max: 37.2 (28 Apr 2019 19:00)  T(F): 98.4 (28 Apr 2019 20:00), Max: 99 (28 Apr 2019 19:00)  HR: 63 (29 Apr 2019 07:00) (63 - 82)  BP: 133/85 (29 Apr 2019 07:00) (105/60 - 159/72)  BP(mean): 105 (29 Apr 2019 07:00) (77 - 117)  ABP: --  ABP(mean): --  RR: 16 (29 Apr 2019 07:00) (13 - 26)  SpO2: 98% (29 Apr 2019 06:00) (95% - 100%)          04-28 @ 07:01  -  04-29 @ 07:00  --------------------------------------------------------  IN: 720 mL / OUT: 2650 mL / NET: -1930 mL      Daily     Daily     PHYSICAL EXAM:  Constitutional:  HEENT:  Respiratory:  Cardiovascular:  Access site:  Gastrointestinal:  Genitourinary:  Extremities:  Vascular:  Neurological:  Skin:      TELEMETRY:  SR rare PVC no events    EKG:       Labs:                          15.2   12.2  )-----------( 290      ( 29 Apr 2019 05:45 )             45.4     04-29    137  |  100  |  8   ----------------------------<  137<H>  4.1   |  24  |  0.88    Ca    9.3      29 Apr 2019 05:45  Phos  3.1     04-29  Mg     2.3     04-29    TPro  7.1  /  Alb  4.2  /  TBili  0.5  /  DBili  x   /  AST  122<H>  /  ALT  97<H>  /  AlkPhos  73  04-29    LIVER FUNCTIONS - ( 29 Apr 2019 05:45 )  Alb: 4.2 g/dL / Pro: 7.1 g/dL / ALK PHOS: 73 U/L / ALT: 97 U/L / AST: 122 U/L / GGT: x           PT/INR - ( 28 Apr 2019 05:20 )   PT: 13.4 sec;   INR: 1.17 ratio         PTT - ( 28 Apr 2019 05:20 )  PTT:>200.0 sec  Creatine Kinase, Serum: 651 U/L (04-29 @ 05:45)  CKMB Units: 22.7 ng/mL (04-29 @ 05:45)  CKMB Units: 55.5 ng/mL (04-28 @ 19:04)  Creatine Kinase, Serum: 1099 U/L (04-28 @ 19:04)  Creatine Kinase, Serum: 1415 U/L (04-28 @ 13:36)  CKMB Units: 88.6 ng/mL (04-28 @ 13:36)        HEALTH ISSUES - PROBLEM Dx: Admission date: April 28th  CHIEF COMPLAINT:  HPI: 52 y/o M w/ a PMH significant for HTN and HLD who initially presented to the [place] ED w/ chest pain and tightness x2 hours. He had just gone out to dinner with his wife the night prior, but denies feeling or doing anything out of the ordinary. Otherwise, he has been in his usual state of health. The pain was left-sided, associated w/ diaphoresis, but denies dyspnea, palpitations. He took 325 mg ASA at home, and since he was still feeling the chest discomfort, he decided to come to the ED. His EKG showed ST elevations in leads II, III, and aVF, so he was transferred to [facility] for LHC. Found to have 99% lesion in OM2, which was intervened on. Also found to have 50% mLAD lesion. At time of arrival to CCU, patient denies any further chest pain, no n/v, lightheadedness, palpitations, or dyspnea. (28 Apr 2019 04:48)     INTERVAL HISTORY: S/P PCI RRA - MARY x 1 to 99% OM2  Resting comfortably in bed; no complaints offered    REVIEW OF SYSTEMS: Denies chest pain, palpitations, SOB, dizziness, headache, NV; all others negative    MEDICATIONS  (STANDING):  aspirin enteric coated 81 milliGRAM(s) Oral daily  atorvastatin 80 milliGRAM(s) Oral at bedtime  chlorhexidine 2% Cloths 1 Application(s) Topical daily  chlorhexidine 4% Liquid 1 Application(s) Topical <User Schedule>  enalapril 20 milliGRAM(s) Oral daily  heparin  Injectable 5000 Unit(s) SubCutaneous every 8 hours  melatonin 5 milliGRAM(s) Oral at bedtime  metoprolol tartrate 25 milliGRAM(s) Oral two times a day  ticagrelor 90 milliGRAM(s) Oral two times a day    MEDICATIONS  (PRN):      Objective:  ICU Vital Signs Last 24 Hrs  T(C): 36.9 (28 Apr 2019 20:00), Max: 37.2 (28 Apr 2019 19:00)  T(F): 98.4 (28 Apr 2019 20:00), Max: 99 (28 Apr 2019 19:00)  HR: 63 (29 Apr 2019 07:00) (63 - 82)  BP: 133/85 (29 Apr 2019 07:00) (105/60 - 159/72)  BP(mean): 105 (29 Apr 2019 07:00) (77 - 117)  ABP: --  ABP(mean): --  RR: 16 (29 Apr 2019 07:00) (13 - 26)  SpO2: 98% (29 Apr 2019 06:00) (95% - 100%)          04-28 @ 07:01  -  04-29 @ 07:00  --------------------------------------------------------  IN: 720 mL / OUT: 2650 mL / NET: -1930 mL      Daily     Daily     PHYSICAL EXAM:  Constitutional: NAD  HEENT: oral mucosa pink moist  Respiratory: Regular unlabored CTA, no wheeze  Cardiovascular: RRR S1S2 no M/R/G; no LE edema  Access site: Right radial site ok no hematoma/ecchymosis; + radial pulse//sensation  Gastrointestinal: soft ND/NT; + bowel sounds  Genitourinary: voiding on own  Extremities: OLIVER equal strength  Vascular: warm peripherally  Neurological: A & O x 3 mood & affect appropriate  Skin: warm dry intact, no rash      TELEMETRY:  SR rare PVC no events    EKG: SR 65;  CATHY 148; QRS 90; QT/QTc 416/432      Labs:                          15.2   12.2  )-----------( 290      ( 29 Apr 2019 05:45 )             45.4     04-29    137  |  100  |  8   ----------------------------<  137<H>  4.1   |  24  |  0.88    Ca    9.3      29 Apr 2019 05:45  Phos  3.1     04-29  Mg     2.3     04-29    TPro  7.1  /  Alb  4.2  /  TBili  0.5  /  DBili  x   /  AST  122<H>  /  ALT  97<H>  /  AlkPhos  73  04-29    LIVER FUNCTIONS - ( 29 Apr 2019 05:45 )  Alb: 4.2 g/dL / Pro: 7.1 g/dL / ALK PHOS: 73 U/L / ALT: 97 U/L / AST: 122 U/L / GGT: x           PT/INR - ( 28 Apr 2019 05:20 )   PT: 13.4 sec;   INR: 1.17 ratio         PTT - ( 28 Apr 2019 05:20 )  PTT:>200.0 sec  Creatine Kinase, Serum: 651 U/L (04-29 @ 05:45)  CKMB Units: 22.7 ng/mL (04-29 @ 05:45)  CKMB Units: 55.5 ng/mL (04-28 @ 19:04)  Creatine Kinase, Serum: 1099 U/L (04-28 @ 19:04)  Creatine Kinase, Serum: 1415 U/L (04-28 @ 13:36)  CKMB Units: 88.6 ng/mL (04-28 @ 13:36)        HEALTH ISSUES - PROBLEM Dx:

## 2019-04-29 NOTE — PROGRESS NOTE ADULT - ATTENDING COMMENTS
Patient is seen and examined with fellow, NP and the CCU house-staff. I agree with the history, physical and the assessment and plan.  STEMI s/p PCI to OM1  on DAPT  on BB and ace inh

## 2019-04-30 ENCOUNTER — TRANSCRIPTION ENCOUNTER (OUTPATIENT)
Age: 54
End: 2019-04-30

## 2019-04-30 VITALS — DIASTOLIC BLOOD PRESSURE: 74 MMHG | RESPIRATION RATE: 19 BRPM | HEART RATE: 72 BPM | SYSTOLIC BLOOD PRESSURE: 133 MMHG

## 2019-04-30 DIAGNOSIS — E11.9 TYPE 2 DIABETES MELLITUS WITHOUT COMPLICATIONS: ICD-10-CM

## 2019-04-30 LAB
ALBUMIN SERPL ELPH-MCNC: 4.3 G/DL — SIGNIFICANT CHANGE UP (ref 3.3–5)
ALP SERPL-CCNC: 80 U/L — SIGNIFICANT CHANGE UP (ref 40–120)
ALT FLD-CCNC: 91 U/L — HIGH (ref 10–45)
ANION GAP SERPL CALC-SCNC: 14 MMOL/L — SIGNIFICANT CHANGE UP (ref 5–17)
AST SERPL-CCNC: 70 U/L — HIGH (ref 10–40)
BASOPHILS # BLD AUTO: 0.1 K/UL — SIGNIFICANT CHANGE UP (ref 0–0.2)
BILIRUB SERPL-MCNC: 0.7 MG/DL — SIGNIFICANT CHANGE UP (ref 0.2–1.2)
BUN SERPL-MCNC: 8 MG/DL — SIGNIFICANT CHANGE UP (ref 7–23)
CALCIUM SERPL-MCNC: 9.6 MG/DL — SIGNIFICANT CHANGE UP (ref 8.4–10.5)
CHLORIDE SERPL-SCNC: 101 MMOL/L — SIGNIFICANT CHANGE UP (ref 96–108)
CO2 SERPL-SCNC: 22 MMOL/L — SIGNIFICANT CHANGE UP (ref 22–31)
CREAT SERPL-MCNC: 0.92 MG/DL — SIGNIFICANT CHANGE UP (ref 0.5–1.3)
EOSINOPHIL # BLD AUTO: 0.2 K/UL — SIGNIFICANT CHANGE UP (ref 0–0.5)
GLUCOSE SERPL-MCNC: 141 MG/DL — HIGH (ref 70–99)
HCT VFR BLD CALC: 45.9 % — SIGNIFICANT CHANGE UP (ref 39–50)
HGB BLD-MCNC: 14.7 G/DL — SIGNIFICANT CHANGE UP (ref 13–17)
LYMPHOCYTES # BLD AUTO: 49 % — HIGH (ref 13–44)
LYMPHOCYTES # BLD AUTO: 5 K/UL — HIGH (ref 1–3.3)
MAGNESIUM SERPL-MCNC: 1.9 MG/DL — SIGNIFICANT CHANGE UP (ref 1.6–2.6)
MCHC RBC-ENTMCNC: 27.1 PG — SIGNIFICANT CHANGE UP (ref 27–34)
MCHC RBC-ENTMCNC: 32.1 GM/DL — SIGNIFICANT CHANGE UP (ref 32–36)
MCV RBC AUTO: 84.3 FL — SIGNIFICANT CHANGE UP (ref 80–100)
MONOCYTES # BLD AUTO: 1.1 K/UL — HIGH (ref 0–0.9)
MONOCYTES NFR BLD AUTO: 9 % — SIGNIFICANT CHANGE UP (ref 2–14)
NEUTROPHILS # BLD AUTO: 5.2 K/UL — SIGNIFICANT CHANGE UP (ref 1.8–7.4)
NEUTROPHILS NFR BLD AUTO: 40 % — LOW (ref 43–77)
PHOSPHATE SERPL-MCNC: 3.2 MG/DL — SIGNIFICANT CHANGE UP (ref 2.5–4.5)
PLATELET # BLD AUTO: 284 K/UL — SIGNIFICANT CHANGE UP (ref 150–400)
POTASSIUM SERPL-MCNC: 4.1 MMOL/L — SIGNIFICANT CHANGE UP (ref 3.5–5.3)
POTASSIUM SERPL-SCNC: 4.1 MMOL/L — SIGNIFICANT CHANGE UP (ref 3.5–5.3)
PROT SERPL-MCNC: 7.1 G/DL — SIGNIFICANT CHANGE UP (ref 6–8.3)
RBC # BLD: 5.44 M/UL — SIGNIFICANT CHANGE UP (ref 4.2–5.8)
RBC # FLD: 12 % — SIGNIFICANT CHANGE UP (ref 10.3–14.5)
SODIUM SERPL-SCNC: 137 MMOL/L — SIGNIFICANT CHANGE UP (ref 135–145)
WBC # BLD: 11.6 K/UL — HIGH (ref 3.8–10.5)
WBC # FLD AUTO: 11.6 K/UL — HIGH (ref 3.8–10.5)

## 2019-04-30 PROCEDURE — C1769: CPT

## 2019-04-30 PROCEDURE — 80053 COMPREHEN METABOLIC PANEL: CPT

## 2019-04-30 PROCEDURE — 84484 ASSAY OF TROPONIN QUANT: CPT

## 2019-04-30 PROCEDURE — 86850 RBC ANTIBODY SCREEN: CPT

## 2019-04-30 PROCEDURE — 93458 L HRT ARTERY/VENTRICLE ANGIO: CPT | Mod: 59

## 2019-04-30 PROCEDURE — C1725: CPT

## 2019-04-30 PROCEDURE — 96374 THER/PROPH/DIAG INJ IV PUSH: CPT

## 2019-04-30 PROCEDURE — 86901 BLOOD TYPING SEROLOGIC RH(D): CPT

## 2019-04-30 PROCEDURE — 83735 ASSAY OF MAGNESIUM: CPT

## 2019-04-30 PROCEDURE — 99285 EMERGENCY DEPT VISIT HI MDM: CPT | Mod: 25

## 2019-04-30 PROCEDURE — 82553 CREATINE MB FRACTION: CPT

## 2019-04-30 PROCEDURE — 99233 SBSQ HOSP IP/OBS HIGH 50: CPT | Mod: GC

## 2019-04-30 PROCEDURE — C1874: CPT

## 2019-04-30 PROCEDURE — 83036 HEMOGLOBIN GLYCOSYLATED A1C: CPT

## 2019-04-30 PROCEDURE — 84443 ASSAY THYROID STIM HORMONE: CPT

## 2019-04-30 PROCEDURE — 93005 ELECTROCARDIOGRAM TRACING: CPT

## 2019-04-30 PROCEDURE — 99153 MOD SED SAME PHYS/QHP EA: CPT

## 2019-04-30 PROCEDURE — 71045 X-RAY EXAM CHEST 1 VIEW: CPT

## 2019-04-30 PROCEDURE — 99152 MOD SED SAME PHYS/QHP 5/>YRS: CPT

## 2019-04-30 PROCEDURE — C8929: CPT

## 2019-04-30 PROCEDURE — 96375 TX/PRO/DX INJ NEW DRUG ADDON: CPT

## 2019-04-30 PROCEDURE — 85027 COMPLETE CBC AUTOMATED: CPT

## 2019-04-30 PROCEDURE — 36415 COLL VENOUS BLD VENIPUNCTURE: CPT

## 2019-04-30 PROCEDURE — 85730 THROMBOPLASTIN TIME PARTIAL: CPT

## 2019-04-30 PROCEDURE — 84100 ASSAY OF PHOSPHORUS: CPT

## 2019-04-30 PROCEDURE — C1887: CPT

## 2019-04-30 PROCEDURE — 80061 LIPID PANEL: CPT

## 2019-04-30 PROCEDURE — 82550 ASSAY OF CK (CPK): CPT

## 2019-04-30 PROCEDURE — 86803 HEPATITIS C AB TEST: CPT

## 2019-04-30 PROCEDURE — 85610 PROTHROMBIN TIME: CPT

## 2019-04-30 PROCEDURE — C9606: CPT | Mod: LC

## 2019-04-30 PROCEDURE — C1894: CPT

## 2019-04-30 PROCEDURE — 86900 BLOOD TYPING SEROLOGIC ABO: CPT

## 2019-04-30 RX ORDER — ATORVASTATIN CALCIUM 80 MG/1
1 TABLET, FILM COATED ORAL
Qty: 30 | Refills: 3 | OUTPATIENT
Start: 2019-04-30 | End: 2019-08-27

## 2019-04-30 RX ORDER — METFORMIN HYDROCHLORIDE 850 MG/1
1 TABLET ORAL
Qty: 60 | Refills: 1 | OUTPATIENT
Start: 2019-04-30 | End: 2019-06-28

## 2019-04-30 RX ORDER — TICAGRELOR 90 MG/1
1 TABLET ORAL
Qty: 60 | Refills: 11 | OUTPATIENT
Start: 2019-04-30 | End: 2020-04-23

## 2019-04-30 RX ORDER — OMEGA-3 ACID ETHYL ESTERS 1 G
1 CAPSULE ORAL
Qty: 0 | Refills: 0 | COMMUNITY

## 2019-04-30 RX ORDER — METOPROLOL TARTRATE 50 MG
1 TABLET ORAL
Qty: 30 | Refills: 1 | OUTPATIENT
Start: 2019-04-30 | End: 2019-06-28

## 2019-04-30 RX ORDER — MAGNESIUM SULFATE 500 MG/ML
1 VIAL (ML) INJECTION ONCE
Qty: 0 | Refills: 0 | Status: COMPLETED | OUTPATIENT
Start: 2019-04-30 | End: 2019-04-30

## 2019-04-30 RX ORDER — ASPIRIN/CALCIUM CARB/MAGNESIUM 324 MG
1 TABLET ORAL
Qty: 30 | Refills: 3 | OUTPATIENT
Start: 2019-04-30 | End: 2019-08-27

## 2019-04-30 RX ORDER — METOPROLOL TARTRATE 50 MG
1 TABLET ORAL
Qty: 0 | Refills: 0 | COMMUNITY

## 2019-04-30 RX ADMIN — Medication 40 MILLIGRAM(S): at 05:47

## 2019-04-30 RX ADMIN — TICAGRELOR 90 MILLIGRAM(S): 90 TABLET ORAL at 05:47

## 2019-04-30 RX ADMIN — Medication 100 GRAM(S): at 07:20

## 2019-04-30 RX ADMIN — Medication 50 MILLIGRAM(S): at 05:47

## 2019-04-30 RX ADMIN — CHLORHEXIDINE GLUCONATE 1 APPLICATION(S): 213 SOLUTION TOPICAL at 05:47

## 2019-04-30 RX ADMIN — HEPARIN SODIUM 5000 UNIT(S): 5000 INJECTION INTRAVENOUS; SUBCUTANEOUS at 05:47

## 2019-04-30 NOTE — PROGRESS NOTE ADULT - ASSESSMENT
This is a 53 year old man with past medical history of HTN and HLD who initially presented to the [place] ED w/ chest pain and tightness x2 hours.  He took 325 mg ASA at home, and went to [facility] found to have ST elevations in leads II, III, and aVF, so he was transferred to [facility] for LHC. MARY to OM2 (99%) also found to have 50% mLAD lesion. Follow up TTE shows EF 60% with hypokinesis of inferior and mid basal inferior-lateral walls. Plan for discharge.

## 2019-04-30 NOTE — DISCHARGE NOTE PROVIDER - CARE PROVIDERS DIRECT ADDRESSES
[email or web address] ,DirectAddress_Unknown,DirectAddress_Unknown

## 2019-04-30 NOTE — PROGRESS NOTE ADULT - ATTENDING COMMENTS
Patient is seen and examined with fellow, NP and the CCU house-staff. I agree with the history, physical and the assessment and plan.  STEMI s/p PCI with MARY to OM  - educated on the importance of DAPT   - Ace-inhibitor initiated  - Beta-blocker initiated  - patient is on Lipitor 80 mg daily  - trend cardiac enzymes  - TTE prior to discharge

## 2019-04-30 NOTE — DISCHARGE NOTE PROVIDER - PROVIDER TOKENS
PROVIDER:[[id number]]] PROVIDER:[[id number]]],PROVIDER:[[id number]]]

## 2019-04-30 NOTE — PROGRESS NOTE ADULT - SUBJECTIVE AND OBJECTIVE BOX
CHIEF COMPLAINT::    INTERVAL HISTORY:    REVIEW OF SYSTEMS: all others negative    MEDICATIONS  (STANDING):  aspirin enteric coated 81 milliGRAM(s) Oral daily  atorvastatin 80 milliGRAM(s) Oral at bedtime  chlorhexidine 2% Cloths 1 Application(s) Topical daily  chlorhexidine 4% Liquid 1 Application(s) Topical <User Schedule>  enalapril 40 milliGRAM(s) Oral daily  heparin  Injectable 5000 Unit(s) SubCutaneous every 8 hours  melatonin 5 milliGRAM(s) Oral at bedtime  metoprolol tartrate 50 milliGRAM(s) Oral two times a day  ticagrelor 90 milliGRAM(s) Oral two times a day    MEDICATIONS  (PRN):      Objective:  Vital Signs Last 24 Hrs  T(C): 36.6 (2019 06:00), Max: 36.7 (2019 15:00)  T(F): 97.8 (2019 06:00), Max: 98.1 (2019 15:00)  HR: 63 (2019 07:00) (60 - 85)  BP: 126/80 (2019 07:00) (105/66 - 186/91)  BP(mean): 98 (2019 07:00) (80 - 130)  RR: 21 (2019 07:00) (15 - 24)  SpO2: 98% (2019 05:00) (97% - 99%)  ICU Vital Signs Last 24 Hrs  T(C): 36.6 (2019 06:00), Max: 36.7 (2019 15:00)  T(F): 97.8 (2019 06:00), Max: 98.1 (2019 15:00)  HR: 63 (2019 07:00) (60 - 85)  BP: 126/80 (2019 07:00) (105/66 - 186/91)  BP(mean): 98 (2019 07:00) (80 - 130)  ABP: --  ABP(mean): --  RR: 21 (2019 07:00) (15 - 24)  SpO2: 98% (2019 05:00) (97% - 99%)      Adult Advanced Hemodynamics Last 24 Hrs  CVP(mm Hg): --  CVP(cm H2O): --  CO: --  CI: --  PA: --  PA(mean): --  PCWP: --  SVR: --  SVRI: --  PVR: --  PVRI: --       @ 07:01  -   @ 07:00  --------------------------------------------------------  IN: 360 mL / OUT: 1200 mL / NET: -840 mL      Daily     Daily Weight in k.4 (2019 07:00)    PHYSICAL EXAM:      Constitutional:    Eyes:    ENMT:    Neck:    Breasts:    Back:    Respiratory:    Cardiovascular:    Gastrointestinal:    Genitourinary:    Rectal:    Extremities:    Vascular:    Neurological:    Skin:    Lymph Nodes:    Musculoskeletal:    Psychiatric:        TELEMETRY:     EKG:     CARDIAC CATH:     ECHO:    IMAGIN.7   11.6  )-----------( 284      ( 2019 06:01 )             45.9         137  |  101  |  8   ----------------------------<  141<H>  4.1   |  22  |  0.92    Ca    9.6      2019 06:01  Phos  3.2       Mg     1.9         TPro  7.1  /  Alb  4.3  /  TBili  0.7  /  DBili  x   /  AST  70<H>  /  ALT  91<H>  /  AlkPhos  80  30    LIVER FUNCTIONS - ( 2019 06:01 )  Alb: 4.3 g/dL / Pro: 7.1 g/dL / ALK PHOS: 80 U/L / ALT: 91 U/L / AST: 70 U/L / GGT: x                 *BLOOD GAS/ARTERIAL/MIXED/VENOUS  *LACTATE      HEALTH ISSUES - PROBLEM Dx:  Type 2 diabetes mellitus without complication, without long-term current use of insulin: Type 2 diabetes mellitus without complication, without long-term current use of insulin  HTN (hypertension): HTN (hypertension)  STEMI involving oth coronary artery of inferior wall: STEMI involving oth coronary artery of inferior wall        HEALTH ISSUES - R/O PROBLEM Dx:      MONICA Murillo NP   # CHIEF COMPLAINT: IWSTEMI    INTERVAL HISTORY:  This is a 53 year old man with past medical history of HTN and HLD who initially presented to the [place] ED w/ chest pain and tightness x2 hours.  He took 325 mg ASA at home, and went to [facility] found to have ST elevations in leads II, III, and aVF, so he was transferred to [facility] for LHC. MARY to OM2 (99%) also found to have 50% mLAD lesion. Follow up TTE shows EF 60% with hypokinesis of inferior and mid basal inferior-lateral walls. Plan for discharge.    REVIEW OF SYSTEMS: Denies CP, SOB,  all others negative    MEDICATIONS  (STANDING):  aspirin enteric coated 81 milliGRAM(s) Oral daily  atorvastatin 80 milliGRAM(s) Oral at bedtime  chlorhexidine 2% Cloths 1 Application(s) Topical daily  chlorhexidine 4% Liquid 1 Application(s) Topical <User Schedule>  enalapril 40 milliGRAM(s) Oral daily  heparin  Injectable 5000 Unit(s) SubCutaneous every 8 hours  melatonin 5 milliGRAM(s) Oral at bedtime  metoprolol tartrate 50 milliGRAM(s) Oral two times a day  ticagrelor 90 milliGRAM(s) Oral two times a day    MEDICATIONS  (PRN):      Objective:  Vital Signs Last 24 Hrs  T(C): 36.6 (2019 06:00), Max: 36.7 (2019 15:00)  T(F): 97.8 (2019 06:00), Max: 98.1 (2019 15:00)  HR: 63 (2019 07:00) (60 - 85)  BP: 126/80 (2019 07:00) (105/66 - 186/91)  BP(mean): 98 (2019 07:00) (80 - 130)  RR: 21 (2019 07:00) (15 - 24)  SpO2: 98% (2019 05:00) (97% - 99%)  ICU Vital Signs Last 24 Hrs  T(C): 36.6 (2019 06:00), Max: 36.7 (2019 15:00)  T(F): 97.8 (2019 06:00), Max: 98.1 (2019 15:00)  HR: 63 (2019 07:00) (60 - 85)  BP: 126/80 (2019 07:00) (105/66 - 186/91)  BP(mean): 98 (2019 07:00) (80 - 130)  ABP: --  ABP(mean): --  RR: 21 (2019 07:00) (15 - 24)  SpO2: 98% (2019 05:00) (97% - 99%)      Adult Advanced Hemodynamics Last 24 Hrs  CVP(mm Hg): --  CVP(cm H2O): --  CO: --  CI: --  PA: --  PA(mean): --  PCWP: --  SVR: --  SVRI: --  PVR: --  PVRI: --       @ 07:01  -   @ 07:00  --------------------------------------------------------  IN: 360 mL / OUT: 1200 mL / NET: -840 mL      Daily     Daily Weight in k.4 (2019 07:00)    PHYSICAL EXAM:      Constitutional:    Eyes:    ENMT:    Neck:    Breasts:    Back:    Respiratory:    Cardiovascular:    Gastrointestinal:    Genitourinary:    Rectal:    Extremities:    Vascular:    Neurological:    Skin:    Lymph Nodes:    Musculoskeletal:    Psychiatric:        TELEMETRY:     EKG:     CARDIAC CATH:     ECHO:    IMAGIN.7   11.6  )-----------( 284      ( 2019 06:01 )             45.9     04-30    137  |  101  |  8   ----------------------------<  141<H>  4.1   |  22  |  0.92    Ca    9.6      2019 06:01  Phos  3.2     04-30  Mg     1.9     04-30    TPro  7.1  /  Alb  4.3  /  TBili  0.7  /  DBili  x   /  AST  70<H>  /  ALT  91<H>  /  AlkPhos  80  04-30    LIVER FUNCTIONS - ( 2019 06:01 )  Alb: 4.3 g/dL / Pro: 7.1 g/dL / ALK PHOS: 80 U/L / ALT: 91 U/L / AST: 70 U/L / GGT: x                 *BLOOD GAS/ARTERIAL/MIXED/VENOUS  *LACTATE      HEALTH ISSUES - PROBLEM Dx:  Type 2 diabetes mellitus without complication, without long-term current use of insulin: Type 2 diabetes mellitus without complication, without long-term current use of insulin  HTN (hypertension): HTN (hypertension)  STEMI involving oth coronary artery of inferior wall: STEMI involving oth coronary artery of inferior wall        HEALTH ISSUES - R/O PROBLEM Dx:      MONICA Murillo NP   # CHIEF COMPLAINT: IWSTEMI    INTERVAL HISTORY:  This is a 53 year old man with past medical history of HTN and HLD who initially presented to the [place] ED w/ chest pain and tightness x2 hours.  He took 325 mg ASA at home, and went to [facility] found to have ST elevations in leads II, III, and aVF, so he was transferred to [facility] for LHC. MARY to OM2 (99%) also found to have 50% mLAD lesion. Follow up TTE shows EF 60% with hypokinesis of inferior and mid basal inferior-lateral walls. Plan for discharge.    REVIEW OF SYSTEMS: Denies CP, SOB,  all others negative    MEDICATIONS  (STANDING):  aspirin enteric coated 81 milliGRAM(s) Oral daily  atorvastatin 80 milliGRAM(s) Oral at bedtime  chlorhexidine 2% Cloths 1 Application(s) Topical daily  chlorhexidine 4% Liquid 1 Application(s) Topical <User Schedule>  enalapril 40 milliGRAM(s) Oral daily  heparin  Injectable 5000 Unit(s) SubCutaneous every 8 hours  melatonin 5 milliGRAM(s) Oral at bedtime  metoprolol tartrate 50 milliGRAM(s) Oral two times a day  ticagrelor 90 milliGRAM(s) Oral two times a day    MEDICATIONS  (PRN):      Objective:  Vital Signs Last 24 Hrs  T(C): 36.6 (2019 06:00), Max: 36.7 (2019 15:00)  T(F): 97.8 (2019 06:00), Max: 98.1 (2019 15:00)  HR: 63 (2019 07:00) (60 - 85)  BP: 126/80 (2019 07:00) (105/66 - 186/91)  BP(mean): 98 (2019 07:00) (80 - 130)  RR: 21 (2019 07:00) (15 - 24)  SpO2: 98% (2019 05:00) (97% - 99%)  ICU Vital Signs Last 24 Hrs  T(C): 36.6 (2019 06:00), Max: 36.7 (2019 15:00)  T(F): 97.8 (2019 06:00), Max: 98.1 (2019 15:00)  HR: 63 (2019 07:00) (60 - 85)  BP: 126/80 (2019 07:00) (105/66 - 186/91)  BP(mean): 98 (2019 07:00) (80 - 130)  ABP: --  ABP(mean): --  RR: 21 (2019 07:00) (15 - 24)  SpO2: 98% (2019 05:00) (97% - 99%)      Adult Advanced Hemodynamics Last 24 Hrs  CVP(mm Hg): --  CVP(cm H2O): --  CO: --  CI: --  PA: --  PA(mean): --  PCWP: --  SVR: --  SVRI: --  PVR: --  PVRI: --       @ 07:01  -   @ 07:00  --------------------------------------------------------  IN: 360 mL / OUT: 1200 mL / NET: -840 mL      Daily     Daily Weight in k.4 (2019 07:00)    PHYSICAL EXAM:      Constitutional: No acute distress    Eyes: PERRL, EOMI    ENMT: Nml oral mucosa    Respiratory: CTA Bilaterally    Cardiovascular: S1S2 RRR    Gastrointestinal: +BS    Extremities: No pedal edema    Vascular: +2 pedal pulses    Neurological: A+OX3          TELEMETRY: SR with rare unifocal PVCs    EKG:     CARDIAC CATH:   < from: Cardiac Cath Lab - Adult (19 @ 03:35) >  [facility]  Department of Cardiology  [address] [place] [address]  [phone]  Cath Lab Report -- Comprehensive Report  Patient: JUANCARLOS PLAZA  Study date: 2019  Account number: [id number]  MR number: [id number]  : 1965  Gender: Male  Race: O  Case Physician(s):  BAHMAN Abbasi M.D.  Referring Physician:  Lynette Hickey M.D.  INDICATIONS: Initial STEMI.  HISTORY: No history of previous myocardial infarction. The patient has  hypertension.  PROCEDURE:  --  Left heart catheterization with ventriculography.  --  Left coronary angiography.  --  Right coronary angiography.  --  Intervention on OM2: drug-eluting stent, balloon angioplasty.  TECHNIQUE: The risks and alternatives of the procedures and conscious  sedation were explained to the patient and informed consent was obtained.  Cardiac catheterization performed electively. Coronary intervention  performed emergently.  Local anesthetic given. Right radial artery access. Local anesthetic given.  A 6FR PRELUDE KIT was inserted in the vessel, utilizing the modified  Seldinger technique. Left heart catheterization. Ventriculography was  performed ( 20 ml). A 5FR  EXPO catheter was utilized. After  recording ascending aortic pressure, the catheter was advanced across the  aortic valve and left ventricular pressure was recorded. Imaging was  performed using an CASTILLO projection. Post-ventriculography LV pressure was  obtained. The catheter was gradually withdrawn into the aorta under  continuous pressure monitoring and aortic pressure was recorded. Left  coronary artery angiography. The vessel was injected utilizing a 5FR FL3.5  EXPO catheter and positioned in the vessel ostia under fluoroscopic  guidance. Angiography was performed in multiple projections using  hand-injection of contrast. Right coronary artery angiography. The vessel  was injected utilizing a 6FR JR4 LAUNCHER catheter and positioned in the  vessel ostia under fluoroscopic guidance. Angiography was performed in  multiple projections using hand-injection of contrast. RADIATION EXPOSURE:  11.9 min. A successful drug-eluting stent with balloon angioplasty was  performed on the 99 % lesion in the 2nd obtuse marginal. Following  intervention there was an excellent angiographic appearance with a 1 %  residual stenosis. There was no dissection. Vessel setup was performed. A  6FR JL3.5 LAUNCHER guiding catheter was used to intubate the vessel.  Vessel setup was performed. A BMW UNIVERSAL 190CM wire was used to cross  the lesion. Balloon angioplasty was performed, using a 2.5 X 15 EUPHORA  balloon, with 1 inflations and a maximum inflation pressure of 16 bradley. A  2.50 X 26 BAO drug-eluting stent was placed across the lesion and  deployed at a maximum inflation pressure of 16 bradley. Balloon angioplasty  was performed, using a 3.00 X 15 EUPHORA NC balloon, with 2 inflations and  a maximum inflation pressure of 20 bradley.  CONTRAST GIVEN: Omnipaque 57 ml. Omnipaque 70 ml.  MEDICATIONS GIVEN: Midazolam, 1 mg, IV. Fentanyl, 25 mcg, IV.  Nitroglycerin, 100 mcg, intracoronary. Nicardipine (Cardene), 250 mcg,  intracoronary. Nicardipine (Cardene), 500 mcg, intracoronary. Heparin,  4000 units, IV. Cardene, 500 mcg.  VENTRICLES: Analysis of regional contractile function demonstrated moderate  posterobasal hypokinesis. EF estimated was 55 %.  CORONARY VESSELS: The coronary circulation is left dominant.  LM:   --  LM: Angiography showed mild atherosclerosis with no flow limiting  lesions.  LAD:  --  Mid LAD: There was a discrete 50 % stenosis at a site with no  prior intervention. The lesion was eccentric. There was BRY grade 3 flow  through the vessel (brisk flow).  --  D1: Angiography showed mild atherosclerosis with no flow limiting  lesions.  --  D2: Angiography showed mild atherosclerosis with no flow limiting  lesions.  CX:   --  Mid circumflex: There was a discrete 50 % stenosis at a site with  no prior intervention. The lesion was eccentric. There was BRY grade 3  flow through the vessel (brisk flow).  --  OM1: The vessel was medium sized. Angiography showed mild  atherosclerosis with no flow limiting lesions.  --  OM2: There was a tubular 99 % stenosis at a site with no prior  intervention. The lesion was hazy, ulcerated, eccentric, and associated  with a moderate filling defect consistent with thrombus. There was BRY  grade 2 flow through the vessel (partial perfusion) and a large vascular  territory distal to the lesion. This is a likely culprit for the patient's  clinical presentation. An intervention was performed.  RCA:   --  RCA: The vessel was small (non-dominant). Angiography showed  mild atherosclerosis with no flow limiting lesions.  COMPLICATIONS: There were no complications.  DIAGNOSTIC IMPRESSIONS: Successful PCI to severe stenosis of OM-2 using  2.5mm [place] MARY  Moderate stenosis of mid LCx and mid LAD  Overall EF 55-60%  DIAGNOSTIC RECOMMENDATIONS: DAPTx 12 months  Aggressive risk factor modification  INTERVENTIONAL IMPRESSIONS: Successful PCI to severe stenosis of OM-2 using  2.5mm [place] MARY  Moderate stenosis of mid LCx and mid LAD  Overall EF 55-60%  INTERVENTIONAL RECOMMENDATIONS: DAPTx 12 months  Aggressive risk factor modification  Prepared and signed by  Lynette Hickey M.D.  Signed 2019 11:56:36  HEMODYNAMIC TABLES  Pressures:  Baseline  Pressures:  - HR: 73  Pressures:  - Rhythm:  Pressures:  -- Aortic Pressure (S/D/M): 111/69/89  Pressures:  -- Left Ventricle (s/edp): 152/24/--  Outputs:  Baseline  Outputs:  -- CALCULATIONS[age]  Outputs:  -- CALCULATIONS: Body Surface Area: 1.91  Outputs:  -- CALCULATIONS: Height in cm: 165.00  Outputs:  -- CALCULATIONS: Sex: Male  Outputs:  -- CALCULATIONS: Weight in k.50    < end of copied text >      ECHO:  < from: TTE with Doppler (w/Cont) (19 @ 10:51) >    Patient name: JUANCARLOS PLAZA  YOB: 1965   Age: 53 (M)   MR#: [id number]  Study Date: 2019  Location: [place] [id number]: Adrianne Frost RDCS  Study quality: Technically fair  Referring Physician: Lynette Hickey MD  Blood Pressure: 135/82 mmHg  Height: 165 cm  Weight: 83 kg  BSA: 1.9 m2  ------------------------------------------------------------------------  PROCEDURE: Transthoracic echocardiogram with 2-D, M-Mode  and complete spectral and color flow Doppler. Verbal  consent wasobtained for injection of  Ultrasonic Enhancing  Agent following a discussion of risks and benefits.  Following intravenous injection of Ultrasonic Enhancing  Agent , harmonic imaging was performed.  INDICATION: Chest pain, unspecified (R07.9)  ------------------------------------------------------------------------  Dimensions:    Normal Values:  LA:     3.6    2.0 - 4.0 cm  Ao:     3.1    2.0 - 3.8 cm  SEPTUM: 1.0    0.6 - 1.2 cm  PWT:    0.9    0.6 - 1.1 cm  LVIDd:  4.8    3.0 - 5.6 cm  LVIDs: 2.8    1.8 - 4.0 cm  Derived variables:  LVMI: 83 g/m2  RWT: 0.37  Fractional short: 42 %  EF (Visual Estimate): 60 %  ------------------------------------------------------------------------  Observations:  Mitral Valve: Normal mitral valve. Minimal mitral  regurgitation.  Aortic Valve/Aorta: Normal trileaflet aortic valve.  Aortic Root: 3.1 cm.  Left Atrium: Normal left atrium.  Left Ventricle: Mild segmental left ventricular systolic  dysfunction. The basal -mid onferolateral  walll and the  inferior walll are hypokinetic.  Endocardial visualization  enhanced with intravenous injection of Ultrasonic Enhancing  Agent (Definity). No left ventricular thrombus. Normal left  ventricular internal dimensions and wall thicknesses.  Normal diastolic function.  Right Heart: Normal right atrium. Normal right ventricular  size and function. Normal tricuspid valve. Minimal  tricuspid regurgitation. Normal pulmonic valve. Minimal  pulmonic regurgitation.  Pericardium/Pleura: Normal pericardium withno pericardial  effusion.  Hemodynamic: Estimated right atrial pressure is 8 mm Hg.  Estimated right ventricular systolic pressure equals 31 mm  Hg, assuming right atrial pressure equals 8 mm Hg,  consistent with normal pulmonary pressures.  ------------------------------------------------------------------------  Conclusions:  1. Mild segmental left ventricular systolic dysfunction.  The basal -mid onferolateral  walll and the inferior walll  are hypokinetic.  Endocardial visualization enhanced with  intravenous injection of Ultrasonic Enhancing Agent  (Definity). No left ventricular thrombus.  2. Normal right ventricular size and function.  *** No previous Echo exam.  ------------------------------------------------------------------------  Confirmed on  2019 - 17:07:28 by Jaqui Murphy M.D.    < end of copied text >      IMAGIN.7   11.6  )-----------( 284      ( 2019 06:01 )             45.9     -    137  |  101  |  8   ----------------------------<  141<H>  4.1   |  22  |  0.92    Ca    9.6      2019 06:01  Phos  3.2     -30  Mg     1.9         TPro  7.1  /  Alb  4.3  /  TBili  0.7  /  DBili  x   /  AST  70<H>  /  ALT  91<H>  /  AlkPhos  80  -30    LIVER FUNCTIONS - ( 2019 06:01 )  Alb: 4.3 g/dL / Pro: 7.1 g/dL / ALK PHOS: 80 U/L / ALT: 91 U/L / AST: 70 U/L / GGT: x                 *BLOOD GAS/ARTERIAL/MIXED/VENOUS  *LACTATE      HEALTH ISSUES - PROBLEM Dx:  Type 2 diabetes mellitus without complication, without long-term current use of insulin: Type 2 diabetes mellitus without complication, without long-term current use of insulin  HTN (hypertension): HTN (hypertension)  STEMI involving oth coronary artery of inferior wall: STEMI involving oth coronary artery of inferior wall        HEALTH ISSUES - R/O PROBLEM Dx:      ARIS MurilloU NP   #

## 2019-04-30 NOTE — PROGRESS NOTE ADULT - PROBLEM SELECTOR PLAN 1
s/p LHC MARY X1 to OM 2  Continue Aspirin, Brilinta, Lipitor, Enalapril 40mg  Change Lopressor to Toprol XL 100mg at night  Lifestyle education changes
s/p PCI MARY to OM2  - c/w ASA, brilinta, lipitor, metoprolol    - c/w enalapril 20mg daily  - monitor access site  - CE down trended  - increase activity

## 2019-04-30 NOTE — PROGRESS NOTE ADULT - PROBLEM SELECTOR PLAN 2
A1c 6.7  Start metformin 500mg daily  Follow up with Primary care physician  DASH CC Diet
c/w metoprolol, enalapril

## 2019-04-30 NOTE — DISCHARGE NOTE PROVIDER - NSDCCPTREATMENT_GEN_ALL_CORE_FT
PRINCIPAL PROCEDURE  Procedure: Left heart cardiac cath  Findings and Treatment: OM2 99% stenosis s/p MARY x 1, prox LAD 50% stenosis, EDP 25      SECONDARY PROCEDURE  Procedure: Transthoracic echocardiogram  Findings and Treatment: EF 60%, normal LVSF, hypokinetic inferior walls & mid-basal inferolateral walls, normal RVF

## 2019-04-30 NOTE — DISCHARGE NOTE PROVIDER - NSDCCPCAREPLAN_GEN_ALL_CORE_FT
PRINCIPAL DISCHARGE DIAGNOSIS  Diagnosis: STEMI involving oth coronary artery of inferior wall  Assessment and Plan of Treatment: Goal: You will remain chest pain free and understand post cath discharge instructions   Continue with a low salt, low fat diet.  Continue weight management.  Take medications as prescribed.  No smoking.  Follow up appointments with your doctor(s) as instruced.  No heavy lifting or pushing/pulling with procedure arm for 2 weeks. No driving for 2 days. You may shower 24 hours following the procedure but avoid baths/swimming for 1 week. Check your wrist site for bleeding and/or swelling daily following procedure and call your doctor immediately if it occurs or if you experience increased pain at the site. Follow up with your cardiologist in 1-2 weeks. You may call [facility] if you have any questions/concerns regarding your procedure [phone].      SECONDARY DISCHARGE DIAGNOSES  Diagnosis: Hyperlipemia  Assessment and Plan of Treatment: Goal: Your LDL cholesterol will be less than 70mg/dL   Continue with your cholesterol medications. Eat a heart healthy diet that is low in saturated fats and salt, and includes whole grains, fruits, vegetables and lean protein; exercise regularly (consult with your physician or cardiologist first); maintain a heart healthy weight; if you smoke - quit (A resource to help you stop smoking is the [facility] for Tobacco Control – phone number 068-228-8805.). Continue to follow with your primary physician or cardiologist.    Diagnosis: Hypertension  Assessment and Plan of Treatment: Goal: Your blood pressure will be controlled.   Continue with your blood pressure medications; eat a heart healthy diet with low salt diet; exercise regularly (consult with your physician or cardiologist first); maintain a heart healthy weight; if you smoke - quit (A resource to help you stop smoking is the [facility] for Tobacco Control – phone number [phone].); include healthy ways to manage stress. Continue to follow with your primary care physician or cardiologist.

## 2019-04-30 NOTE — DISCHARGE NOTE PROVIDER - NSDCFUADDINST_GEN_ALL_CORE_FT
No heavy lifting or pushing/pulling with procedure arm for 2 weeks. No driving for 2 days. You may shower 24 hours following the procedure but avoid baths/swimming for 1 week. Check your wrist site for bleeding and/or swelling daily following procedure and call your doctor immediately if it occurs or if you experience increased pain at the site. Follow up with your cardiologist in 1-2 weeks. You may call [facility] if you have any questions/concerns regarding your procedure [phone].

## 2019-04-30 NOTE — DISCHARGE NOTE PROVIDER - CARE PROVIDER_API CALL
Kaye Laboy)  Cardiovascular Disease; Internal Medicine; Nuclear Cardiology  [address] [place] [address]  Phone: [phone]  Fax: [phone]  Follow Up Time: Tamir Morris)  Internal Medicine  [address], [place] [id number]  Phone: [phone]  Fax: [phone]  Follow Up Time:     Foster Salcedo)  Internal Medicine  [address] [place] [address]  Phone: [phone]  Fax: [phone]  Follow Up Time:

## 2019-04-30 NOTE — DISCHARGE NOTE PROVIDER - HOSPITAL COURSE
This is a 52 y/o M w/ a PMH significant for HTN and HLD who initially presented to the [place] ED w/ chest pain and tightness x2 hours. He had just gone out to dinner with his wife the night prior, but denies feeling or doing anything out of the ordinary. Otherwise, he has been in his usual state of health. The pain was left-sided, associated w/ diaphoresis, but denies dyspnea, palpitations. He took 325 mg ASA at home, and since he was still feeling the chest discomfort, he decided to come to the ED. His EKG showed ST elevations in leads II, III, and aVF, so he was transferred to [facility] for LHC. Found to have 99% lesion in OM2, which was intervened on. Also found to have 50% mLAD lesion. At time of arrival to CCU, patient denies any further chest pain, no n/v, lightheadedness, palpitations, or dyspnea.        Hospital Course     C s/p MARY to OM2 via RRA access.  Started on dual antiplatelet therapy, high intensity statin and placed back on beta blocker and ACE-i.  Hospital course has been uneventful.  RRA site soft, no swelling or hematoma.  Discharge home to follow up with cardiology within 2 weeks.

## 2019-04-30 NOTE — DISCHARGE NOTE NURSING/CASE MANAGEMENT/SOCIAL WORK - NSDCDPATPORTLINK_GEN_ALL_CORE
You can access the [facility] Patient Portal, offered by [facility], by registering with the following website: [email or web address]

## 2019-04-30 NOTE — DISCHARGE NOTE PROVIDER - NSDCFUADDAPPT_GEN_ALL_CORE_FT
Please schedule an appoint with Dr. Morris, Cardiology within 10-14 days after discharge from hospital.    Please schedule an appointment within two weeks from discharge from hospital with Dr. Salcedo as follow up for being newly diagnosed with Type 2 Diabetes now on Metformin.

## 2019-04-30 NOTE — PROGRESS NOTE ADULT - PROBLEM SELECTOR PROBLEM 1
STEMI involving oth coronary artery of inferior wall
STEMI involving oth coronary artery of inferior wall

## 2019-05-01 PROBLEM — Z00.00 ENCOUNTER FOR PREVENTIVE HEALTH EXAMINATION: Status: ACTIVE | Noted: 2019-05-01

## 2019-05-01 PROBLEM — I10 ESSENTIAL (PRIMARY) HYPERTENSION: Chronic | Status: ACTIVE | Noted: 2019-04-28

## 2019-05-01 PROBLEM — E78.00 PURE HYPERCHOLESTEROLEMIA, UNSPECIFIED: Chronic | Status: ACTIVE | Noted: 2019-04-28

## 2019-05-08 ENCOUNTER — TRANSCRIPTION ENCOUNTER (OUTPATIENT)
Age: 54
End: 2019-05-08

## 2019-05-08 ENCOUNTER — NON-APPOINTMENT (OUTPATIENT)
Age: 54
End: 2019-05-08

## 2019-05-08 ENCOUNTER — APPOINTMENT (OUTPATIENT)
Dept: CARDIOLOGY | Facility: CLINIC | Age: 54
End: 2019-05-08
Payer: COMMERCIAL

## 2019-05-08 VITALS
BODY MASS INDEX: 30.66 KG/M2 | OXYGEN SATURATION: 99 % | HEIGHT: 65 IN | WEIGHT: 184 LBS | DIASTOLIC BLOOD PRESSURE: 87 MMHG | SYSTOLIC BLOOD PRESSURE: 157 MMHG | HEART RATE: 70 BPM

## 2019-05-08 DIAGNOSIS — Z82.3 FAMILY HISTORY OF STROKE: ICD-10-CM

## 2019-05-08 PROCEDURE — 99215 OFFICE O/P EST HI 40 MIN: CPT

## 2019-05-08 PROCEDURE — 93000 ELECTROCARDIOGRAM COMPLETE: CPT

## 2019-05-08 NOTE — REVIEW OF SYSTEMS
[Feeling Fatigued] : feeling fatigued [Dysphagia] : dysphagia [Dizziness] : dizziness [Anxiety] : anxiety [Negative] : Genitourinary [Fever] : no fever [Headache] : no headache [Skin: A Rash] : no rash: [Joint Pain] : no joint pain [Chills] : no chills [Easy Bleeding] : no tendency for easy bleeding [Depression] : no depression [Excessive Thirst] : no polydipsia [Easy Bruising] : no tendency for easy bruising

## 2019-05-08 NOTE — DISCUSSION/SUMMARY
[FreeTextEntry1] : This is a 53-year-old  male who had a recent inferior wall MI today to a tight lesion of OM 2. He was treated with a drug-eluting stent. He has residual 50% mid LAD and mid circumflex lesions. Blood pressure andheart rate arewell controlled. He is hemodynamically stable.\par \par We discussed a good lifestyle which includes a healthy diet, exercise, and weight control. Over the next several weeks we'll gradually increase his walking. He has any problems he will call me. 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 He will not return to work for at least a couple weeks until he gets his stamina back. He should not be driving into the city for several weeks.\par \par He will change his enalapril from 40 mg once a day to 20 mg twice a day, and the metoprolol from 100 mg once a day to 50 mg twice a day. In the morning he sometimes feels lightheaded and he may be taking too much medication at one time.\par \par If all is well I will see him in about 3 weeks. We did review his hospitalization in detail, I went over his testing, and answered all their questions.

## 2019-05-08 NOTE — HISTORY OF PRESENT ILLNESS
[FreeTextEntry1] : I saw Rusty Dior In the office today for cardiac evaluation. He is a 53-year-old  male who has been treated for hypertension hyperlipidemia. He had stopped his Lipitor. He has no history of smoking, diabetes, or family history of heart disease was walked on the treadmill about a half an hour a day.\par \par Approximately 12 days ago, he developed a pain in his left chest. He checked his blood pressure at 220. Took his wife's amlodipine. He became diaphoretic and went to the emergency room at [facility]. ECG showed acute inferior wall myocardial infarction. He was transferred to [place]. Cardiac catheterization demonstrated  99% lesion of OM 2 treated with drug-eluting stents. Had a 50% mid LAD and 50% mid circumflex lesion. He had a non-dominant right. Echo showed ejection fraction of 60% with inferior hypocontractility. There were no complications in the hospital and he was discharged on aspirin and Brilinta. At home he has been feeling fatigued but has no recurrent chest pain.\par \par A resting 12-lead electrocardiogram demonstrates sinus rhythm. There is evidence for recent inferior wall infarction.

## 2019-05-08 NOTE — PHYSICAL EXAM
[General Appearance - Well Developed] : well developed [Normal Appearance] : normal appearance [Well Groomed] : well groomed [General Appearance - Well Nourished] : well nourished [No Deformities] : no deformities [General Appearance - In No Acute Distress] : no acute distress [Normal Conjunctiva] : the conjunctiva exhibited no abnormalities [Normal Jugular Venous A Waves Present] : normal jugular venous A waves present [Normal Jugular Venous V Waves Present] : normal jugular venous V waves present [No Jugular Venous Hatch A Waves] : no jugular venous hatch A waves [Normal Rate] : normal [Normal S1] : normal S1 [Normal S2] : normal S2 [No Murmur] : no murmurs heard [2+] : left 2+ [1+] : left 1+ [No Abnormalities] : the abdominal aorta was not enlarged and no bruit was heard [No Pitting Edema] : no pitting edema present [Respiration, Rhythm And Depth] : normal respiratory rhythm and effort [Exaggerated Use Of Accessory Muscles For Inspiration] : no accessory muscle use [Auscultation Breath Sounds / Voice Sounds] : lungs were clear to auscultation bilaterally [Bowel Sounds] : normal bowel sounds [Abdomen Soft] : soft [Abdomen Tenderness] : non-tender [Abnormal Walk] : normal gait [Gait - Sufficient For Exercise Testing] : the gait was sufficient for exercise testing [Nail Clubbing] : no clubbing of the fingernails [Cyanosis, Localized] : no localized cyanosis [Skin Color & Pigmentation] : normal skin color and pigmentation [Skin Turgor] : normal skin turgor [] : no rash [Oriented To Time, Place, And Person] : oriented to person, place, and time [Impaired Insight] : insight and judgment were intact [No Anxiety] : not feeling anxious [S3] : no S3 [S4] : no S4 [Left Carotid Bruit] : no bruit heard over the left carotid [Right Carotid Bruit] : no bruit heard over the right carotid [Right Femoral Bruit] : no bruit heard over the right femoral artery [Left Femoral Bruit] : no bruit heard over the left femoral artery

## 2019-05-29 ENCOUNTER — APPOINTMENT (OUTPATIENT)
Dept: CARDIOLOGY | Facility: CLINIC | Age: 54
End: 2019-05-29
Payer: COMMERCIAL

## 2019-05-29 VITALS
BODY MASS INDEX: 30.66 KG/M2 | HEART RATE: 65 BPM | WEIGHT: 184 LBS | DIASTOLIC BLOOD PRESSURE: 77 MMHG | HEIGHT: 65 IN | SYSTOLIC BLOOD PRESSURE: 138 MMHG | OXYGEN SATURATION: 99 %

## 2019-05-29 PROCEDURE — 99214 OFFICE O/P EST MOD 30 MIN: CPT

## 2019-05-29 NOTE — DISCUSSION/SUMMARY
[FreeTextEntry1] : The patient is doing well. On his medication he has normal blood pressure. He is having no recurrent symptoms.\par \par He will stay on his present medication. He'll gradually increase his walking. If he has any symptoms he would call me. He will come back in a month for blood work and if all is well I will see him in 2 months. At that time we'll schedule him for a stress test.

## 2019-05-29 NOTE — HISTORY OF PRESENT ILLNESS
[FreeTextEntry1] : I saw Rusty Dior In the office today for cardiac follow up. He is a 53-year-old [place] male who has been treated for hypertension hyperlipidemia. He had stopped his Lipitor. He has no history of smoking, diabetes, or family history of heart disease was walked on the treadmill about a half an hour a day.\par \par 4/19, he developed a pain in his left chest. He checked his blood pressure at 220. Took his wife's amlodipine. He became diaphoretic and went to the emergency room at [facility]. ECG showed acute inferior wall myocardial infarction. He was transferred to [place]. Cardiac catheterization demonstrated  99% lesion of OM 2 treated with drug-eluting stents. Had a 50% mid LAD and 50% mid circumflex lesion. He had a non-dominant right. Echo showed ejection fraction of 60% with inferior hypocontractility. There were no complications in the hospital and he was discharged on aspirin and Brilinta. At home he has been feeling fatigued but has no recurrent chest pain.\par \par Last visit I changed him from enalapril 40 once a day to 20 mg twice a day, and metoprolol 100 mg once a day to 50 mg twice a day. We discussed good lifestyle. He is to monitor his blood pressure home and get normal readings.\par \par He is back to work part time and is walking and has no symptoms.

## 2019-05-29 NOTE — REVIEW OF SYSTEMS
[Feeling Fatigued] : feeling fatigued [Dysphagia] : dysphagia [Dizziness] : dizziness [Anxiety] : anxiety [Negative] : Genitourinary [Headache] : no headache [Fever] : no fever [Joint Pain] : no joint pain [Chills] : no chills [Depression] : no depression [Skin: A Rash] : no rash: [Excessive Thirst] : no polydipsia [Easy Bleeding] : no tendency for easy bleeding [Easy Bruising] : no tendency for easy bruising

## 2019-05-29 NOTE — PHYSICAL EXAM
[General Appearance - Well Developed] : well developed [Normal Appearance] : normal appearance [General Appearance - Well Nourished] : well nourished [Well Groomed] : well groomed [General Appearance - In No Acute Distress] : no acute distress [No Deformities] : no deformities [Normal Conjunctiva] : the conjunctiva exhibited no abnormalities [Normal Jugular Venous A Waves Present] : normal jugular venous A waves present [Normal Jugular Venous V Waves Present] : normal jugular venous V waves present [No Jugular Venous Hatch A Waves] : no jugular venous hatch A waves [Exaggerated Use Of Accessory Muscles For Inspiration] : no accessory muscle use [Respiration, Rhythm And Depth] : normal respiratory rhythm and effort [Auscultation Breath Sounds / Voice Sounds] : lungs were clear to auscultation bilaterally [Bowel Sounds] : normal bowel sounds [Abdomen Soft] : soft [Abdomen Tenderness] : non-tender [Abnormal Walk] : normal gait [Gait - Sufficient For Exercise Testing] : the gait was sufficient for exercise testing [Cyanosis, Localized] : no localized cyanosis [Nail Clubbing] : no clubbing of the fingernails [Skin Turgor] : normal skin turgor [] : no rash [Skin Color & Pigmentation] : normal skin color and pigmentation [Oriented To Time, Place, And Person] : oriented to person, place, and time [Impaired Insight] : insight and judgment were intact [No Anxiety] : not feeling anxious [Normal Rate] : normal [Normal S1] : normal S1 [Normal S2] : normal S2 [No Murmur] : no murmurs heard [2+] : Carotid: right 2+ [1+] : right 1+ [No Abnormalities] : the abdominal aorta was not enlarged and no bruit was heard [No Pitting Edema] : no pitting edema present [S3] : no S3 [S4] : no S4 [Right Femoral Bruit] : no bruit heard over the right femoral artery [Right Carotid Bruit] : no bruit heard over the right carotid [Left Carotid Bruit] : no bruit heard over the left carotid [Left Femoral Bruit] : no bruit heard over the left femoral artery

## 2019-06-21 ENCOUNTER — RX RENEWAL (OUTPATIENT)
Age: 54
End: 2019-06-21

## 2019-07-15 LAB
ALBUMIN SERPL ELPH-MCNC: 4.6 G/DL
ALP BLD-CCNC: 93 U/L
ALT SERPL-CCNC: 33 U/L
ANION GAP SERPL CALC-SCNC: 13 MMOL/L
AST SERPL-CCNC: 23 U/L
BASOPHILS # BLD AUTO: 0.07 K/UL
BASOPHILS NFR BLD AUTO: 0.6 %
BILIRUB SERPL-MCNC: 0.5 MG/DL
BUN SERPL-MCNC: 8 MG/DL
CALCIUM SERPL-MCNC: 9.3 MG/DL
CHLORIDE SERPL-SCNC: 103 MMOL/L
CHOLEST SERPL-MCNC: 82 MG/DL
CHOLEST/HDLC SERPL: 2.7 RATIO
CO2 SERPL-SCNC: 24 MMOL/L
CREAT SERPL-MCNC: 1.06 MG/DL
EOSINOPHIL # BLD AUTO: 0.34 K/UL
EOSINOPHIL NFR BLD AUTO: 3 %
GLUCOSE SERPL-MCNC: 126 MG/DL
HCT VFR BLD CALC: 43.9 %
HDLC SERPL-MCNC: 31 MG/DL
HGB BLD-MCNC: 14.1 G/DL
IMM GRANULOCYTES NFR BLD AUTO: 0.2 %
LDLC SERPL CALC-MCNC: 30 MG/DL
LYMPHOCYTES # BLD AUTO: 4.85 K/UL
LYMPHOCYTES NFR BLD AUTO: 42.2 %
MAN DIFF?: NORMAL
MCHC RBC-ENTMCNC: 28 PG
MCHC RBC-ENTMCNC: 32.1 GM/DL
MCV RBC AUTO: 87.1 FL
MONOCYTES # BLD AUTO: 1.02 K/UL
MONOCYTES NFR BLD AUTO: 8.9 %
NEUTROPHILS # BLD AUTO: 5.2 K/UL
NEUTROPHILS NFR BLD AUTO: 45.1 %
PLATELET # BLD AUTO: 275 K/UL
POTASSIUM SERPL-SCNC: 4.4 MMOL/L
PROT SERPL-MCNC: 6.7 G/DL
PSA SERPL-MCNC: 7.29 NG/ML
RBC # BLD: 5.04 M/UL
RBC # FLD: 13.1 %
SODIUM SERPL-SCNC: 140 MMOL/L
TRIGL SERPL-MCNC: 106 MG/DL
WBC # FLD AUTO: 11.5 K/UL

## 2019-07-29 ENCOUNTER — APPOINTMENT (OUTPATIENT)
Dept: CARDIOLOGY | Facility: CLINIC | Age: 54
End: 2019-07-29
Payer: COMMERCIAL

## 2019-07-29 VITALS
OXYGEN SATURATION: 98 % | WEIGHT: 180 LBS | SYSTOLIC BLOOD PRESSURE: 138 MMHG | BODY MASS INDEX: 29.99 KG/M2 | HEART RATE: 64 BPM | DIASTOLIC BLOOD PRESSURE: 78 MMHG | HEIGHT: 65 IN

## 2019-07-29 DIAGNOSIS — R09.89 OTHER SPECIFIED SYMPTOMS AND SIGNS INVOLVING THE CIRCULATORY AND RESPIRATORY SYSTEMS: ICD-10-CM

## 2019-07-29 PROCEDURE — 99214 OFFICE O/P EST MOD 30 MIN: CPT

## 2019-07-29 NOTE — HISTORY OF PRESENT ILLNESS
[FreeTextEntry1] : I saw Rusty Dior In the office today for cardiac follow up. He is a 53-year-old [place] male who has been treated for hypertension hyperlipidemia. He had stopped his Lipitor. He has no history of smoking, diabetes, or family history of heart disease was walked on the treadmill about a half an hour a day.\par \par 4/19, he developed a pain in his left chest. He checked his blood pressure at 220. Took his wife's amlodipine. He became diaphoretic and went to the emergency room at [facility]. ECG showed acute inferior wall myocardial infarction. He was transferred to [place]. Cardiac catheterization demonstrated  99% lesion of OM 2 treated with drug-eluting stents. Had a 50% mid LAD and 50% mid circumflex lesion. He had a non-dominant right. Echo showed ejection fraction of 60% with inferior hypocontractility. There were no complications in the hospital and he was discharged on aspirin and Brilinta. At home he has been feeling fatigued but has no recurrent chest pain.\par \par Last visit I changed him from enalapril 40 once a day to 20 mg twice a day, and metoprolol 100 mg once a day to 50 mg twice a day. We discussed good lifestyle. He is to monitor his blood pressure home and get normal readings.\par \par The patient is back to normal activities having no symptoms. He lost about 4 pounds. Blood work demonstrates a total cholesterol of 82, HDL 31, LDL 30. CBC and CMP unremarkable except for an elevation of blood sugar to 126..

## 2019-07-29 NOTE — PHYSICAL EXAM
[General Appearance - Well Developed] : well developed [Normal Appearance] : normal appearance [Well Groomed] : well groomed [General Appearance - Well Nourished] : well nourished [General Appearance - In No Acute Distress] : no acute distress [No Deformities] : no deformities [Normal Conjunctiva] : the conjunctiva exhibited no abnormalities [Normal Jugular Venous V Waves Present] : normal jugular venous V waves present [Normal Jugular Venous A Waves Present] : normal jugular venous A waves present [No Jugular Venous Hatch A Waves] : no jugular venous hatch A waves [Respiration, Rhythm And Depth] : normal respiratory rhythm and effort [Auscultation Breath Sounds / Voice Sounds] : lungs were clear to auscultation bilaterally [Exaggerated Use Of Accessory Muscles For Inspiration] : no accessory muscle use [Bowel Sounds] : normal bowel sounds [Abdomen Soft] : soft [Abdomen Tenderness] : non-tender [Abnormal Walk] : normal gait [Gait - Sufficient For Exercise Testing] : the gait was sufficient for exercise testing [Nail Clubbing] : no clubbing of the fingernails [Cyanosis, Localized] : no localized cyanosis [Skin Color & Pigmentation] : normal skin color and pigmentation [Skin Turgor] : normal skin turgor [] : no rash [Oriented To Time, Place, And Person] : oriented to person, place, and time [Impaired Insight] : insight and judgment were intact [No Anxiety] : not feeling anxious [Normal Rate] : normal [Normal S1] : normal S1 [Normal S2] : normal S2 [No Murmur] : no murmurs heard [2+] : left 2+ [1+] : left 1+ [No Abnormalities] : the abdominal aorta was not enlarged and no bruit was heard [No Pitting Edema] : no pitting edema present [Normal Oral Mucosa] : normal oral mucosa [No Oral Pallor] : no oral pallor [No Oral Cyanosis] : no oral cyanosis [S3] : no S3 [S4] : no S4 [Right Carotid Bruit] : no bruit heard over the right carotid [Left Carotid Bruit] : no bruit heard over the left carotid [Right Femoral Bruit] : no bruit heard over the right femoral artery [Left Femoral Bruit] : no bruit heard over the left femoral artery

## 2019-07-29 NOTE — REVIEW OF SYSTEMS
[Feeling Fatigued] : feeling fatigued [Dysphagia] : dysphagia [Dizziness] : dizziness [Anxiety] : anxiety [Negative] : Genitourinary [Chills] : no chills [Fever] : no fever [Headache] : no headache [Skin: A Rash] : no rash: [Joint Pain] : no joint pain [Depression] : no depression [Excessive Thirst] : no polydipsia [Easy Bleeding] : no tendency for easy bleeding [Easy Bruising] : no tendency for easy bruising

## 2019-07-29 NOTE — DISCUSSION/SUMMARY
[FreeTextEntry1] : The patient is doing well without any signs or symptoms of active heart disease. On his medications blood pressure and cholesterol well-controlled. He needs to continue to lose weight to bring down his shoulder.\par \par He will stay on his present medication. We'll schedule a baseline carotid Doppler will go for a nuclear stress test to make sure to 50% blockages in the LAD and circumflex and not significant.\par \par If all is well I will see him in 3 months. The patient may require prostate attention. His urologist is Dr. Paddy Lyons in [place]. The patient knows that his Brilinta cannot be stopped.

## 2019-08-03 ENCOUNTER — APPOINTMENT (OUTPATIENT)
Dept: CARDIOLOGY | Facility: CLINIC | Age: 54
End: 2019-08-03
Payer: COMMERCIAL

## 2019-08-03 PROCEDURE — 93880 EXTRACRANIAL BILAT STUDY: CPT

## 2019-09-04 ENCOUNTER — APPOINTMENT (OUTPATIENT)
Dept: CARDIOLOGY | Facility: CLINIC | Age: 54
End: 2019-09-04

## 2019-09-17 ENCOUNTER — TRANSCRIPTION ENCOUNTER (OUTPATIENT)
Age: 54
End: 2019-09-17

## 2019-12-18 ENCOUNTER — APPOINTMENT (OUTPATIENT)
Dept: CARDIOLOGY | Facility: CLINIC | Age: 54
End: 2019-12-18
Payer: COMMERCIAL

## 2019-12-18 VITALS
HEIGHT: 65 IN | BODY MASS INDEX: 30.16 KG/M2 | HEART RATE: 77 BPM | SYSTOLIC BLOOD PRESSURE: 146 MMHG | WEIGHT: 181 LBS | OXYGEN SATURATION: 98 % | DIASTOLIC BLOOD PRESSURE: 80 MMHG

## 2019-12-18 PROCEDURE — 99214 OFFICE O/P EST MOD 30 MIN: CPT

## 2019-12-18 NOTE — DISCUSSION/SUMMARY
[FreeTextEntry1] : The patient is doing well without any signs or symptoms of active heart disease. He needs to work hard to try to lose weight\par \par He'll stay on his present medications and go for blood work to check on his lipid profile. He will schedule a nuclear stress test to check on his 50% lesions in his LAD and circumflex.\par \par Assuming the stress test is normal and he well I would see him in 4 months. At that time he will be 1 year out from his stent and we will reevaluate his need for medication

## 2019-12-18 NOTE — HISTORY OF PRESENT ILLNESS
[FreeTextEntry1] : I saw Rusty Dior In the office today for cardiac follow up. He is a 54-year-old [place] male who has been treated for hypertension hyperlipidemia. He had stopped his Lipitor. He has no history of smoking, diabetes, or family history of heart disease was walked on the treadmill about a half an hour a day.\par \par 4/19, he developed a pain in his left chest. He checked his blood pressure at 220. Took his wife's amlodipine. He became diaphoretic and went to the emergency room at [facility]. ECG showed acute inferior wall myocardial infarction. He was transferred to [place]. Cardiac catheterization demonstrated  99% lesion of OM 2 treated with drug-eluting stents. Had a 50% mid LAD and 50% mid circumflex lesion. He had a non-dominant right. Echo showed ejection fraction of 60% with inferior hypocontractility. There were no complications in the hospital and he was discharged on aspirin and Brilinta. At home he has been feeling fatigued but has no recurrent chest pain.\par \par Last visit I changed him from enalapril 40 once a day to 20 mg twice a day, and metoprolol 100 mg once a day to 50 mg twice a day. We discussed good lifestyle. He is to monitor his blood pressure home and get normal readings.\par \par The patient is back to normal activities having no symptoms. His weight has been stable. Blood work  7/19 demonstrates a total cholesterol of 82, HDL 31, LDL 30. CBC and CMP unremarkable except for an elevation of blood sugar to 126..

## 2019-12-18 NOTE — PHYSICAL EXAM
[General Appearance - Well Developed] : well developed [Normal Appearance] : normal appearance [Well Groomed] : well groomed [No Deformities] : no deformities [General Appearance - Well Nourished] : well nourished [General Appearance - In No Acute Distress] : no acute distress [Normal Conjunctiva] : the conjunctiva exhibited no abnormalities [Normal Oral Mucosa] : normal oral mucosa [No Oral Pallor] : no oral pallor [Normal Jugular Venous A Waves Present] : normal jugular venous A waves present [No Oral Cyanosis] : no oral cyanosis [Normal Jugular Venous V Waves Present] : normal jugular venous V waves present [No Jugular Venous Hatch A Waves] : no jugular venous hatch A waves [Respiration, Rhythm And Depth] : normal respiratory rhythm and effort [Exaggerated Use Of Accessory Muscles For Inspiration] : no accessory muscle use [Auscultation Breath Sounds / Voice Sounds] : lungs were clear to auscultation bilaterally [Bowel Sounds] : normal bowel sounds [Abdomen Soft] : soft [Abdomen Tenderness] : non-tender [Abnormal Walk] : normal gait [Gait - Sufficient For Exercise Testing] : the gait was sufficient for exercise testing [Cyanosis, Localized] : no localized cyanosis [Nail Clubbing] : no clubbing of the fingernails [Skin Color & Pigmentation] : normal skin color and pigmentation [Skin Turgor] : normal skin turgor [] : no rash [Oriented To Time, Place, And Person] : oriented to person, place, and time [Impaired Insight] : insight and judgment were intact [No Anxiety] : not feeling anxious [Normal Rate] : normal [Normal S1] : normal S1 [Normal S2] : normal S2 [No Murmur] : no murmurs heard [2+] : Carotid: right 2+ [1+] : left 1+ [No Abnormalities] : the abdominal aorta was not enlarged and no bruit was heard [No Pitting Edema] : no pitting edema present [S3] : no S3 [Right Carotid Bruit] : no bruit heard over the right carotid [S4] : no S4 [Left Carotid Bruit] : no bruit heard over the left carotid [Right Femoral Bruit] : no bruit heard over the right femoral artery [Left Femoral Bruit] : no bruit heard over the left femoral artery

## 2020-01-29 ENCOUNTER — LABORATORY RESULT (OUTPATIENT)
Age: 55
End: 2020-01-29

## 2020-01-29 LAB
ALBUMIN SERPL ELPH-MCNC: 4.8 G/DL
ALP BLD-CCNC: 84 U/L
ALT SERPL-CCNC: 34 U/L
ANION GAP SERPL CALC-SCNC: 15 MMOL/L
AST SERPL-CCNC: 24 U/L
BILIRUB SERPL-MCNC: 0.4 MG/DL
BUN SERPL-MCNC: 10 MG/DL
CALCIUM SERPL-MCNC: 9.4 MG/DL
CHLORIDE SERPL-SCNC: 103 MMOL/L
CHOLEST SERPL-MCNC: 149 MG/DL
CHOLEST/HDLC SERPL: 4 RATIO
CO2 SERPL-SCNC: 24 MMOL/L
CREAT SERPL-MCNC: 1.03 MG/DL
ESTIMATED AVERAGE GLUCOSE: 134 MG/DL
GLUCOSE SERPL-MCNC: 129 MG/DL
HBA1C MFR BLD HPLC: 6.3 %
HDLC SERPL-MCNC: 37 MG/DL
LDLC SERPL CALC-MCNC: 73 MG/DL
POTASSIUM SERPL-SCNC: 4.3 MMOL/L
PROT SERPL-MCNC: 6.9 G/DL
PSA SERPL-MCNC: 9.24 NG/ML
SODIUM SERPL-SCNC: 141 MMOL/L
TRIGL SERPL-MCNC: 197 MG/DL

## 2020-01-30 LAB
BASOPHILS # BLD AUTO: 0 K/UL
BASOPHILS NFR BLD AUTO: 0 %
EOSINOPHIL # BLD AUTO: 0.71 K/UL
EOSINOPHIL NFR BLD AUTO: 6.1 %
HCT VFR BLD CALC: 43.8 %
HGB BLD-MCNC: 14.3 G/DL
LYMPHOCYTES # BLD AUTO: 5.4 K/UL
LYMPHOCYTES NFR BLD AUTO: 46.5 %
MAN DIFF?: NORMAL
MCHC RBC-ENTMCNC: 28 PG
MCHC RBC-ENTMCNC: 32.6 GM/DL
MCV RBC AUTO: 85.7 FL
MONOCYTES # BLD AUTO: 1.02 K/UL
MONOCYTES NFR BLD AUTO: 8.8 %
NEUTROPHILS # BLD AUTO: 3.27 K/UL
NEUTROPHILS NFR BLD AUTO: 28.1 %
PLATELET # BLD AUTO: 237 K/UL
RBC # BLD: 5.11 M/UL
RBC # FLD: 12.7 %
WBC # FLD AUTO: 11.62 K/UL

## 2020-02-05 ENCOUNTER — APPOINTMENT (OUTPATIENT)
Dept: CARDIOLOGY | Facility: CLINIC | Age: 55
End: 2020-02-05
Payer: COMMERCIAL

## 2020-02-05 PROCEDURE — 78452 HT MUSCLE IMAGE SPECT MULT: CPT

## 2020-02-05 PROCEDURE — A9500: CPT

## 2020-02-05 PROCEDURE — 93015 CV STRESS TEST SUPVJ I&R: CPT

## 2020-02-11 ENCOUNTER — APPOINTMENT (OUTPATIENT)
Dept: CARDIOLOGY | Facility: CLINIC | Age: 55
End: 2020-02-11
Payer: COMMERCIAL

## 2020-02-11 VITALS
WEIGHT: 179 LBS | HEART RATE: 61 BPM | BODY MASS INDEX: 29.82 KG/M2 | HEIGHT: 65 IN | DIASTOLIC BLOOD PRESSURE: 80 MMHG | OXYGEN SATURATION: 99 % | SYSTOLIC BLOOD PRESSURE: 154 MMHG

## 2020-02-11 PROCEDURE — 99214 OFFICE O/P EST MOD 30 MIN: CPT

## 2020-02-11 NOTE — HISTORY OF PRESENT ILLNESS
[FreeTextEntry1] : I saw Rusty Dior In the office today for cardiac follow up. He is a 54-year-old [place] male who has been treated for hypertension hyperlipidemia. He had stopped his Lipitor. He has no history of smoking, diabetes, or family history of heart disease was walked on the treadmill about a half an hour a day.\par \par 4/19, he developed a pain in his left chest. He checked his blood pressure at 220. Took his wife's amlodipine. He became diaphoretic and went to the emergency room at [facility]. ECG showed acute inferior wall myocardial infarction. He was transferred to [place]. Cardiac catheterization demonstrated  99% lesion of OM 2 treated with drug-eluting stents. Had a 50% mid LAD and 50% mid circumflex lesion. He had a non-dominant right. Echo showed ejection fraction of 60% with inferior hypocontractility. There were no complications in the hospital and he was discharged on aspirin and Brilinta. At home he has been feeling fatigued but has no recurrent chest pain.\par \par Last visit I changed him from enalapril 40 once a day to 20 mg twice a day, and metoprolol 100 mg once a day to 50 mg twice a day. We discussed good lifestyle. He is to monitor his blood pressure home and get normal readings.\par \par Hitting nuclear stress test performed earlier this month that was normal workload of 9 metastases. Ejection fraction 70%. Blood work done in January showed an elevated PSA of 9.24. His seeing a urologist. His cholesterol is 149, triglycerides 197, HDL 37, and LDL 73. This is up from his previous test. A1c was 6.3. White blood cell count is mildly elevated 11.6 with persistent elevation and lymphocytes.\par \par The patient is back to normal activities having no symptoms. His weight has been stable. Blood work  7/19 demonstrates a total cholesterol of 82, HDL 31, LDL 30. CBC and CMP unremarkable except for an elevation of blood sugar to 126..

## 2020-02-11 NOTE — PHYSICAL EXAM
[General Appearance - Well Developed] : well developed [Normal Appearance] : normal appearance [Well Groomed] : well groomed [General Appearance - Well Nourished] : well nourished [No Deformities] : no deformities [General Appearance - In No Acute Distress] : no acute distress [Normal Conjunctiva] : the conjunctiva exhibited no abnormalities [Normal Oral Mucosa] : normal oral mucosa [No Oral Pallor] : no oral pallor [No Oral Cyanosis] : no oral cyanosis [Normal Jugular Venous A Waves Present] : normal jugular venous A waves present [Normal Jugular Venous V Waves Present] : normal jugular venous V waves present [No Jugular Venous Hatch A Waves] : no jugular venous hatch A waves [Respiration, Rhythm And Depth] : normal respiratory rhythm and effort [Exaggerated Use Of Accessory Muscles For Inspiration] : no accessory muscle use [Auscultation Breath Sounds / Voice Sounds] : lungs were clear to auscultation bilaterally [Bowel Sounds] : normal bowel sounds [Abdomen Soft] : soft [Abdomen Tenderness] : non-tender [Abnormal Walk] : normal gait [Gait - Sufficient For Exercise Testing] : the gait was sufficient for exercise testing [Nail Clubbing] : no clubbing of the fingernails [Cyanosis, Localized] : no localized cyanosis [Skin Color & Pigmentation] : normal skin color and pigmentation [Skin Turgor] : normal skin turgor [] : no rash [Oriented To Time, Place, And Person] : oriented to person, place, and time [Impaired Insight] : insight and judgment were intact [No Anxiety] : not feeling anxious [Normal Rate] : normal [Normal S1] : normal S1 [Normal S2] : normal S2 [No Murmur] : no murmurs heard [2+] : left 2+ [1+] : left 1+ [No Abnormalities] : the abdominal aorta was not enlarged and no bruit was heard [No Pitting Edema] : no pitting edema present [S3] : no S3 [S4] : no S4 [Right Carotid Bruit] : no bruit heard over the right carotid [Left Carotid Bruit] : no bruit heard over the left carotid [Right Femoral Bruit] : no bruit heard over the right femoral artery [Left Femoral Bruit] : no bruit heard over the left femoral artery

## 2020-02-11 NOTE — REVIEW OF SYSTEMS
[Feeling Fatigued] : feeling fatigued [Dysphagia] : dysphagia [Dizziness] : dizziness [Anxiety] : anxiety [Negative] : Genitourinary [Fever] : no fever [Headache] : no headache [Chills] : no chills [Joint Pain] : no joint pain [Skin: A Rash] : no rash: [Depression] : no depression [Excessive Thirst] : no polydipsia [Easy Bleeding] : no tendency for easy bleeding [Easy Bruising] : no tendency for easy bruising

## 2020-02-11 NOTE — DISCUSSION/SUMMARY
[FreeTextEntry1] : We discussed the importance of lifestyle including diet, exercise, and weight control. This should help his blood sugar and triglycerides. He will stay on his present medication. I am sending a copy of his blood work to you, to see what you think about the white blood cell count.\par \par if the patient has any symptoms he will call me. We did go over the results of his recent blood work as well as a stress test. He'll keep on aspirin and Plavix until April and then probably will stop the aspirin and continue low-dose Plavix. If he does need a prostate biopsy he should wait until April when he is a year out from his stent.\par \par If all is well I will see him again in approximately 3 months

## 2020-03-06 ENCOUNTER — RX RENEWAL (OUTPATIENT)
Age: 55
End: 2020-03-06

## 2020-03-06 ENCOUNTER — TRANSCRIPTION ENCOUNTER (OUTPATIENT)
Age: 55
End: 2020-03-06

## 2020-03-24 ENCOUNTER — TRANSCRIPTION ENCOUNTER (OUTPATIENT)
Age: 55
End: 2020-03-24

## 2020-03-26 ENCOUNTER — TRANSCRIPTION ENCOUNTER (OUTPATIENT)
Age: 55
End: 2020-03-26

## 2020-06-22 ENCOUNTER — RX RENEWAL (OUTPATIENT)
Age: 55
End: 2020-06-22

## 2020-07-13 ENCOUNTER — APPOINTMENT (OUTPATIENT)
Dept: CARDIOLOGY | Facility: CLINIC | Age: 55
End: 2020-07-13
Payer: COMMERCIAL

## 2020-07-13 ENCOUNTER — LABORATORY RESULT (OUTPATIENT)
Age: 55
End: 2020-07-13

## 2020-07-13 ENCOUNTER — NON-APPOINTMENT (OUTPATIENT)
Age: 55
End: 2020-07-13

## 2020-07-13 VITALS
SYSTOLIC BLOOD PRESSURE: 165 MMHG | BODY MASS INDEX: 30.49 KG/M2 | DIASTOLIC BLOOD PRESSURE: 92 MMHG | WEIGHT: 183 LBS | OXYGEN SATURATION: 98 % | HEIGHT: 65 IN | HEART RATE: 67 BPM

## 2020-07-13 LAB
ALBUMIN SERPL ELPH-MCNC: 4.6 G/DL
ALP BLD-CCNC: 90 U/L
ALT SERPL-CCNC: 43 U/L
ANION GAP SERPL CALC-SCNC: 15 MMOL/L
AST SERPL-CCNC: 27 U/L
BILIRUB SERPL-MCNC: 0.4 MG/DL
BUN SERPL-MCNC: 10 MG/DL
CALCIUM SERPL-MCNC: 9.5 MG/DL
CHLORIDE SERPL-SCNC: 98 MMOL/L
CHOLEST SERPL-MCNC: 141 MG/DL
CHOLEST/HDLC SERPL: 3.8 RATIO
CO2 SERPL-SCNC: 26 MMOL/L
CREAT SERPL-MCNC: 1.12 MG/DL
ESTIMATED AVERAGE GLUCOSE: 137 MG/DL
GLUCOSE SERPL-MCNC: 114 MG/DL
HBA1C MFR BLD HPLC: 6.4 %
HDLC SERPL-MCNC: 37 MG/DL
LDLC SERPL CALC-MCNC: 50 MG/DL
POTASSIUM SERPL-SCNC: 4.6 MMOL/L
PROT SERPL-MCNC: 6.8 G/DL
PSA SERPL-MCNC: 8.54 NG/ML
SARS-COV-2 IGG SERPL IA-ACNC: <3.8 AU/ML
SARS-COV-2 IGG SERPL QL IA: NEGATIVE
SODIUM SERPL-SCNC: 139 MMOL/L
TRIGL SERPL-MCNC: 270 MG/DL
TSH SERPL-ACNC: 1.97 UIU/ML

## 2020-07-13 PROCEDURE — 99214 OFFICE O/P EST MOD 30 MIN: CPT

## 2020-07-13 PROCEDURE — 93000 ELECTROCARDIOGRAM COMPLETE: CPT

## 2020-07-13 NOTE — PHYSICAL EXAM
[General Appearance - Well Developed] : well developed [Normal Appearance] : normal appearance [Well Groomed] : well groomed [General Appearance - Well Nourished] : well nourished [No Deformities] : no deformities [General Appearance - In No Acute Distress] : no acute distress [Normal Conjunctiva] : the conjunctiva exhibited no abnormalities [Normal Oral Mucosa] : normal oral mucosa [No Oral Pallor] : no oral pallor [No Oral Cyanosis] : no oral cyanosis [Normal Jugular Venous A Waves Present] : normal jugular venous A waves present [Normal Jugular Venous V Waves Present] : normal jugular venous V waves present [No Jugular Venous Hatch A Waves] : no jugular venous hatch A waves [Respiration, Rhythm And Depth] : normal respiratory rhythm and effort [Exaggerated Use Of Accessory Muscles For Inspiration] : no accessory muscle use [Auscultation Breath Sounds / Voice Sounds] : lungs were clear to auscultation bilaterally [Bowel Sounds] : normal bowel sounds [Abdomen Soft] : soft [Abdomen Tenderness] : non-tender [Abnormal Walk] : normal gait [Gait - Sufficient For Exercise Testing] : the gait was sufficient for exercise testing [Nail Clubbing] : no clubbing of the fingernails [Skin Color & Pigmentation] : normal skin color and pigmentation [Cyanosis, Localized] : no localized cyanosis [] : no rash [Skin Turgor] : normal skin turgor [Oriented To Time, Place, And Person] : oriented to person, place, and time [Impaired Insight] : insight and judgment were intact [Normal Rate] : normal [No Anxiety] : not feeling anxious [Normal S1] : normal S1 [Normal S2] : normal S2 [No Murmur] : no murmurs heard [2+] : left 2+ [No Abnormalities] : the abdominal aorta was not enlarged and no bruit was heard [1+] : left 1+ [No Pitting Edema] : no pitting edema present [S3] : no S3 [Right Carotid Bruit] : no bruit heard over the right carotid [S4] : no S4 [Right Femoral Bruit] : no bruit heard over the right femoral artery [Left Carotid Bruit] : no bruit heard over the left carotid [Left Femoral Bruit] : no bruit heard over the left femoral artery

## 2020-07-13 NOTE — HISTORY OF PRESENT ILLNESS
[FreeTextEntry1] : I saw Rusty Dior In the office today for cardiac follow up. He is a 54-year-old [place]ian male who has been treated for hypertension hyperlipidemia. He had stopped his Lipitor. He has no history of smoking, diabetes, or family history of heart disease was walked on the treadmill about a half an hour a day.\par \par 4/19, he developed a pain in his left chest. He checked his blood pressure at 220. Took his wife's amlodipine. He became diaphoretic and went to the emergency room at [facility]. ECG showed acute inferior wall myocardial infarction. He was transferred to [place]. Cardiac catheterization demonstrated  99% lesion of OM 2 treated with drug-eluting stents. Had a 50% mid LAD and 50% mid circumflex lesion. He had a non-dominant right. Echo showed ejection fraction of 60% with inferior hypocontractility. There were no complications in the hospital and he was discharged on aspirin and Brilinta. At home he has been feeling fatigued but has no recurrent chest pain.\par \par Last visit I changed him from enalapril 40 once a day to 20 mg twice a day, and metoprolol 100 mg once a day to 50 mg twice a day. We discussed good lifestyle. He is to monitor his blood pressure home and get normal readings.\par \par A nuclear stress test performed 2/20 was normal workload of 9 mets. Ejection fraction 70%. Blood work done in January 2020 showed an elevated PSA of 9.24. His seeing a urologist. His cholesterol is 149, triglycerides 197, HDL 37, and LDL 73. This is up from his previous test. A1c was 6.3. 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 Is not activated came back almost 5 pounds. In March he thought he had a viral infection although he tested negative antibody. Now feeling better less fatigued. He is having no chest pain or shortness of breath.\par \par ECG demonstrates sinus rhythm and appears normal.

## 2020-07-13 NOTE — DISCUSSION/SUMMARY
[FreeTextEntry1] : The patient has no signs or symptoms of active heart disease. He is to increase his activity and lose weight that he has gained. He'll stop taking the aspirin and continue Plavix since he is one year out from stent.\par \par He will go for general blood work  and will repeat his covid antibody test.\par \par If he has any additional symptoms he will call me.  We did review his medication and blood work and I answered his questions. I would see him in 4 months.

## 2020-07-13 NOTE — REVIEW OF SYSTEMS
[Feeling Fatigued] : feeling fatigued [Dysphagia] : dysphagia [Dizziness] : dizziness [Anxiety] : anxiety [Negative] : Gastrointestinal [Fever] : no fever [Headache] : no headache [Joint Pain] : no joint pain [Skin: A Rash] : no rash: [Chills] : no chills [Depression] : no depression [Excessive Thirst] : no polydipsia [Easy Bleeding] : no tendency for easy bleeding [Easy Bruising] : no tendency for easy bruising

## 2020-07-14 LAB
BASOPHILS # BLD AUTO: 0.2 K/UL
BASOPHILS NFR BLD AUTO: 1.7 %
EOSINOPHIL # BLD AUTO: 0.51 K/UL
EOSINOPHIL NFR BLD AUTO: 4.4 %
HCT VFR BLD CALC: 42.3 %
HGB BLD-MCNC: 13.9 G/DL
LYMPHOCYTES # BLD AUTO: 4.74 K/UL
LYMPHOCYTES NFR BLD AUTO: 40.9 %
MAN DIFF?: NORMAL
MCHC RBC-ENTMCNC: 28.1 PG
MCHC RBC-ENTMCNC: 32.9 GM/DL
MCV RBC AUTO: 85.6 FL
MONOCYTES # BLD AUTO: 1.61 K/UL
MONOCYTES NFR BLD AUTO: 13.9 %
NEUTROPHILS # BLD AUTO: 3.62 K/UL
NEUTROPHILS NFR BLD AUTO: 31.3 %
PLATELET # BLD AUTO: 248 K/UL
RBC # BLD: 4.94 M/UL
RBC # FLD: 12.5 %
WBC # FLD AUTO: 11.58 K/UL

## 2020-07-24 ENCOUNTER — RX RENEWAL (OUTPATIENT)
Age: 55
End: 2020-07-24

## 2020-08-24 ENCOUNTER — RX RENEWAL (OUTPATIENT)
Age: 55
End: 2020-08-24

## 2020-09-21 ENCOUNTER — RX RENEWAL (OUTPATIENT)
Age: 55
End: 2020-09-21

## 2020-10-05 NOTE — PATIENT PROFILE ADULT - [id number]_GEN_A_NUR
Detail Level: Detailed Depth Of Biopsy: dermis Was A Bandage Applied: Yes Size Of Lesion In Cm: 1.5 X Size Of Lesion In Cm: 0 Biopsy Type: H and E Biopsy Method: double edge Personna blade Anesthesia Type: 1% lidocaine with epinephrine Anesthesia Volume In Cc (Will Not Render If 0): 0.5 Hemostasis: Aluminum Chloride Wound Care: Petrolatum Dressing: bandage Destruction After The Procedure: No Type Of Destruction Used: Curettage Curettage Text: The wound bed was treated with curettage after the biopsy was performed. Cryotherapy Text: The wound bed was treated with cryotherapy after the biopsy was performed. Electrodesiccation Text: The wound bed was treated with electrodesiccation after the biopsy was performed. Electrodesiccation And Curettage Text: The wound bed was treated with electrodesiccation and curettage after the biopsy was performed. Silver Nitrate Text: The wound bed was treated with silver nitrate after the biopsy was performed. Lab: -[id number] Consent: Written consent was obtained and risks were reviewed including but not limited to scarring, infection, bleeding, scabbing, incomplete removal, nerve damage and allergy to anesthesia. Post-Care Instructions: I reviewed with the patient in detail post-care instructions. Patient is to keep the biopsy site dry overnight, and then apply bacitracin twice daily until healed. Patient may apply hydrogen peroxide soaks to remove any crusting. Notification Instructions: Patient will be notified of biopsy results. However, patient instructed to call the office if not contacted within 2 weeks. Billing Type: Third-Party Bill Information: Selecting Yes will display possible errors in your note based on the variables you have selected. This validation is only offered as a suggestion for you. PLEASE NOTE THAT THE VALIDATION TEXT WILL BE REMOVED WHEN YOU FINALIZE YOUR NOTE. IF YOU WANT TO FAX A PRELIMINARY NOTE YOU WILL NEED TO TOGGLE THIS TO 'NO' IF YOU DO NOT WANT IT IN YOUR FAXED NOTE. individual instruction

## 2020-10-08 ENCOUNTER — RX RENEWAL (OUTPATIENT)
Age: 55
End: 2020-10-08

## 2020-12-11 ENCOUNTER — RX RENEWAL (OUTPATIENT)
Age: 55
End: 2020-12-11

## 2020-12-21 ENCOUNTER — TRANSCRIPTION ENCOUNTER (OUTPATIENT)
Age: 55
End: 2020-12-21

## 2020-12-21 ENCOUNTER — LABORATORY RESULT (OUTPATIENT)
Age: 55
End: 2020-12-21

## 2020-12-21 LAB
ALBUMIN SERPL ELPH-MCNC: 4.8 G/DL
ALP BLD-CCNC: 95 U/L
ALT SERPL-CCNC: 71 U/L
ANION GAP SERPL CALC-SCNC: 10 MMOL/L
AST SERPL-CCNC: 43 U/L
BASOPHILS # BLD AUTO: 0.31 K/UL
BASOPHILS NFR BLD AUTO: 2.6 %
BILIRUB SERPL-MCNC: 0.5 MG/DL
BUN SERPL-MCNC: 9 MG/DL
CALCIUM SERPL-MCNC: 10.1 MG/DL
CHLORIDE SERPL-SCNC: 100 MMOL/L
CHOLEST SERPL-MCNC: 154 MG/DL
CO2 SERPL-SCNC: 29 MMOL/L
CREAT SERPL-MCNC: 1.04 MG/DL
EOSINOPHIL # BLD AUTO: 0.61 K/UL
EOSINOPHIL NFR BLD AUTO: 5.2 %
ESTIMATED AVERAGE GLUCOSE: 140 MG/DL
GLUCOSE SERPL-MCNC: 125 MG/DL
HBA1C MFR BLD HPLC: 6.5 %
HCT VFR BLD CALC: 45.2 %
HDLC SERPL-MCNC: 36 MG/DL
HGB BLD-MCNC: 14.5 G/DL
LDLC SERPL CALC-MCNC: 69 MG/DL
LDLC SERPL DIRECT ASSAY-MCNC: 93 MG/DL
LYMPHOCYTES # BLD AUTO: 6.35 K/UL
LYMPHOCYTES NFR BLD AUTO: 53.9 %
MAN DIFF?: NORMAL
MCHC RBC-ENTMCNC: 28.2 PG
MCHC RBC-ENTMCNC: 32.1 GM/DL
MCV RBC AUTO: 87.8 FL
MONOCYTES # BLD AUTO: 0.61 K/UL
MONOCYTES NFR BLD AUTO: 5.2 %
NEUTROPHILS # BLD AUTO: 3.9 K/UL
NEUTROPHILS NFR BLD AUTO: 33.1 %
NONHDLC SERPL-MCNC: 118 MG/DL
PLATELET # BLD AUTO: 266 K/UL
POTASSIUM SERPL-SCNC: 4.6 MMOL/L
PROT SERPL-MCNC: 7.5 G/DL
RBC # BLD: 5.15 M/UL
RBC # FLD: 12.5 %
SODIUM SERPL-SCNC: 138 MMOL/L
TRIGL SERPL-MCNC: 244 MG/DL
WBC # FLD AUTO: 11.78 K/UL

## 2020-12-23 ENCOUNTER — APPOINTMENT (OUTPATIENT)
Dept: CARDIOLOGY | Facility: CLINIC | Age: 55
End: 2020-12-23
Payer: COMMERCIAL

## 2020-12-23 VITALS
SYSTOLIC BLOOD PRESSURE: 162 MMHG | OXYGEN SATURATION: 97 % | BODY MASS INDEX: 30.99 KG/M2 | HEIGHT: 65 IN | HEART RATE: 73 BPM | DIASTOLIC BLOOD PRESSURE: 82 MMHG | WEIGHT: 186 LBS

## 2020-12-23 PROCEDURE — 99214 OFFICE O/P EST MOD 30 MIN: CPT

## 2020-12-23 PROCEDURE — 99072 ADDL SUPL MATRL&STAF TM PHE: CPT

## 2020-12-23 NOTE — HISTORY OF PRESENT ILLNESS
[FreeTextEntry1] : I saw Rusty Diro In the office today for cardiac follow up. He is a 55-year-old [place] Islander male who has been treated for hypertension and hyperlipidemia. He had stopped his Lipitor. He has no history of smoking, diabetes, or family history of heart disease was walked on the treadmill about a half an hour a day.\par \par 4/19, he developed a pain in his left chest. He checked his blood pressure at 220. Took his wife's amlodipine. He became diaphoretic and went to the emergency room at [facility]. ECG showed acute inferior wall myocardial infarction. He was transferred to [place]. Cardiac catheterization demonstrated  99% lesion of OM 2 treated with drug-eluting stents. Had a 50% mid LAD and 50% mid circumflex lesion. He had a non-dominant right. Echo showed ejection fraction of 60% with inferior hypocontractility. There were no complications in the hospital and he was discharged on aspirin and Brilinta. At home he has been feeling fatigued but has no recurrent chest pain.\par \par Last visit I changed him from enalapril 40 once a day to 20 mg twice a day, and metoprolol 100 mg once a day to 50 mg twice a day. We discussed good lifestyle. He is to monitor his blood pressure home and get normal readings.\par \par A nuclear stress test performed 2/20 was normal workload of 9 mets. Ejection fraction 70%. Blood work done in January 2020 showed an elevated PSA of 9.24. His seeing a urologist. Recent blood work performed 12/20 demonstrated a total cholesterol of 154, triglycerides 244, HDL 36, and direct LDL 93. A1c was 6.5. 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 In March he thought he had a viral infection although he tested negative antibody. Now feeling better less fatigued. He is having no chest pain or shortness of breath.\par \par Patient shoveled snow last week. He had no symptoms while he was doing it.The day after and for several days he had some soreness in his left chest that was sustained  and is gradually getting better. This most likely is musculoskeletal.\par \par ECG demonstrates sinus rhythm and appears normal.

## 2020-12-23 NOTE — DISCUSSION/SUMMARY
[FreeTextEntry1] : The patient is doing well. He is watching his diet and exercising regularly and has no exertional symptoms. His triglycerides are high and he will start taking fish oil 2 g twice a day. He will stay on his other medications as prescribed. He has a persistently mild elevation in his white blood cell count and will go to the hematologist for evaluation. He will schedule a regular stress test. If all is well I will see him in 3 months at which time we'll repeat his blood work.\par \par \par We did go over his medication, his blood work, and I answered his questions.

## 2021-02-22 ENCOUNTER — APPOINTMENT (OUTPATIENT)
Dept: CARDIOLOGY | Facility: CLINIC | Age: 56
End: 2021-02-22
Payer: COMMERCIAL

## 2021-02-22 PROCEDURE — 93015 CV STRESS TEST SUPVJ I&R: CPT

## 2021-02-22 PROCEDURE — 99072 ADDL SUPL MATRL&STAF TM PHE: CPT

## 2021-03-03 ENCOUNTER — TRANSCRIPTION ENCOUNTER (OUTPATIENT)
Age: 56
End: 2021-03-03

## 2021-03-23 ENCOUNTER — NON-APPOINTMENT (OUTPATIENT)
Age: 56
End: 2021-03-23

## 2021-03-23 ENCOUNTER — APPOINTMENT (OUTPATIENT)
Dept: CARDIOLOGY | Facility: CLINIC | Age: 56
End: 2021-03-23
Payer: COMMERCIAL

## 2021-03-23 VITALS
BODY MASS INDEX: 31.32 KG/M2 | DIASTOLIC BLOOD PRESSURE: 94 MMHG | HEIGHT: 65 IN | OXYGEN SATURATION: 97 % | WEIGHT: 188 LBS | HEART RATE: 66 BPM | SYSTOLIC BLOOD PRESSURE: 172 MMHG

## 2021-03-23 PROCEDURE — 99072 ADDL SUPL MATRL&STAF TM PHE: CPT

## 2021-03-23 PROCEDURE — 93000 ELECTROCARDIOGRAM COMPLETE: CPT

## 2021-03-23 PROCEDURE — 99214 OFFICE O/P EST MOD 30 MIN: CPT

## 2021-03-23 NOTE — DISCUSSION/SUMMARY
[FreeTextEntry1] : Patient is feeling well.  He is exercising at least 45 minutes a day without any chest pain or shortness of breath.  He never started the fish oil for his triglycerides.\par \par Going to change him from indapamide to hydrochlorothiazide 25 mg once a day for his blood pressure.  Will monitor his blood pressure at home and call me with the results.  Once his blood pressure comes under control then we may add fish oil to bring down his triglycerides.\par \par Discussed lifestyle including diet, exercise, and weight control.  The patient so far has been unable to lose weight.  If all is well I will see him again in approximately 2 months.

## 2021-03-23 NOTE — HISTORY OF PRESENT ILLNESS
[FreeTextEntry1] : I saw Rusty Dior In the office today for cardiac follow up. He is a 55-year-old [place] male who has been treated for hypertension and hyperlipidemia. He had stopped his Lipitor. He has no history of smoking, diabetes, or family history of heart disease was walked on the treadmill about a half an hour a day.\par \par 4/19, he developed a pain in his left chest. He checked his blood pressure at 220. Took his wife's amlodipine. He became diaphoretic and went to the emergency room at [facility]. ECG showed acute inferior wall myocardial infarction. He was transferred to [place]. Cardiac catheterization demonstrated  99% lesion of OM 2 treated with drug-eluting stents. Had a 50% mid LAD and 50% mid circumflex lesion. He had a non-dominant right. Echo showed ejection fraction of 60% with inferior hypocontractility. There were no complications in the hospital and he was discharged on aspirin and Brilinta. At home he has been feeling fatigued but has no recurrent chest pain.\par \par Last visit I changed him from enalapril 40 once a day to 20 mg twice a day, and metoprolol 100 mg once a day to 50 mg twice a day. We discussed good lifestyle. He is to monitor his blood pressure home and get normal readings.\par \par A nuclear stress test performed 2/20 was normal workload of 9 mets. Ejection fraction 70%. Blood work done in January 2020 showed an elevated PSA of 9.24. His seeing a urologist. Recent blood work performed 12/20 demonstrated a total cholesterol of 154, triglycerides 244, HDL 36, and direct LDL 93. A1c was 6.5. 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 In March he thought he had a viral infection although he tested negative antibody. Now feeling better less fatigued. He is having no chest pain or shortness of breath.\par \par Patient shoveled snow last week. He had no symptoms while he was doing it.The day after and for several days he had some soreness in his left chest that was sustained  and is gradually getting better. This most likely is musculoskeletal.  Underwent a stress test 2/21 that was normal at workload of 10 METS.  There was hypertension during exercise with a peak blood pressure of 170/100.  He has been monitoring his blood pressure at home and has been in the 150s consistently.\par \par ECG demonstrates sinus rhythm and appears normal.

## 2021-05-06 ENCOUNTER — RX RENEWAL (OUTPATIENT)
Age: 56
End: 2021-05-06

## 2021-07-26 ENCOUNTER — RX RENEWAL (OUTPATIENT)
Age: 56
End: 2021-07-26

## 2021-10-05 ENCOUNTER — NON-APPOINTMENT (OUTPATIENT)
Age: 56
End: 2021-10-05

## 2021-12-10 ENCOUNTER — RX RENEWAL (OUTPATIENT)
Age: 56
End: 2021-12-10

## 2021-12-23 ENCOUNTER — APPOINTMENT (OUTPATIENT)
Dept: CARDIOLOGY | Facility: CLINIC | Age: 56
End: 2021-12-23
Payer: MEDICAID

## 2021-12-23 VITALS
OXYGEN SATURATION: 98 % | DIASTOLIC BLOOD PRESSURE: 98 MMHG | SYSTOLIC BLOOD PRESSURE: 173 MMHG | HEART RATE: 63 BPM | BODY MASS INDEX: 30.99 KG/M2 | HEIGHT: 65 IN | WEIGHT: 186 LBS

## 2021-12-23 DIAGNOSIS — R73.9 HYPERGLYCEMIA, UNSPECIFIED: ICD-10-CM

## 2021-12-23 PROCEDURE — 99214 OFFICE O/P EST MOD 30 MIN: CPT

## 2021-12-23 NOTE — DISCUSSION/SUMMARY
[FreeTextEntry1] : Clinically the patient is doing well.  He has no chest pain, shortness of breath, palpitation.  We discussed his medications, his blood work, and his lifestyle.  He needs to lose at least 10 pounds which will help his blood pressure, and his high sugar.\par \par I am going to add amlodipine 2.5 mg once a day for blood pressure.  He will call me in 2 to 3 weeks.  If he has no side effects we will gradually titrate this medication to hopefully control his blood pressure.\par \par If all is well I will see him in 6 months.  I answered all of his questions.

## 2021-12-23 NOTE — PHYSICAL EXAM
[Normal Appearance] : normal appearance [General Appearance - Well Developed] : well developed [Well Groomed] : well groomed [General Appearance - Well Nourished] : well nourished [No Deformities] : no deformities [General Appearance - In No Acute Distress] : no acute distress [Normal Conjunctiva] : the conjunctiva exhibited no abnormalities [Normal Oral Mucosa] : normal oral mucosa [No Oral Pallor] : no oral pallor [No Oral Cyanosis] : no oral cyanosis [Normal Jugular Venous A Waves Present] : normal jugular venous A waves present [Normal Jugular Venous V Waves Present] : normal jugular venous V waves present [No Jugular Venous Hatch A Waves] : no jugular venous hatch A waves [Respiration, Rhythm And Depth] : normal respiratory rhythm and effort [Exaggerated Use Of Accessory Muscles For Inspiration] : no accessory muscle use [Auscultation Breath Sounds / Voice Sounds] : lungs were clear to auscultation bilaterally [Bowel Sounds] : normal bowel sounds [Abdomen Soft] : soft [Abdomen Tenderness] : non-tender [Abnormal Walk] : normal gait [Gait - Sufficient For Exercise Testing] : the gait was sufficient for exercise testing [Nail Clubbing] : no clubbing of the fingernails [Cyanosis, Localized] : no localized cyanosis [Skin Color & Pigmentation] : normal skin color and pigmentation [Skin Turgor] : normal skin turgor [] : no rash [Oriented To Time, Place, And Person] : oriented to person, place, and time [Impaired Insight] : insight and judgment were intact [No Anxiety] : not feeling anxious [Normal Rate] : normal [Normal S1] : normal S1 [Normal S2] : normal S2 [No Murmur] : no murmurs heard [2+] : left 2+ [1+] : left 1+ [No Abnormalities] : the abdominal aorta was not enlarged and no bruit was heard [No Pitting Edema] : no pitting edema present [S3] : no S3 [S4] : no S4 [Right Carotid Bruit] : no bruit heard over the right carotid [Left Carotid Bruit] : no bruit heard over the left carotid [Right Femoral Bruit] : no bruit heard over the right femoral artery [Left Femoral Bruit] : no bruit heard over the left femoral artery

## 2021-12-23 NOTE — REVIEW OF SYSTEMS
[Feeling Fatigued] : feeling fatigued [Dysphagia] : dysphagia [Dizziness] : dizziness [Negative] : Genitourinary [Joint Pain] : no joint pain [Rash] : no rash [Depression] : no depression [Anxiety] : no anxiety [Easy Bleeding] : no tendency for easy bleeding [Easy Bruising] : no tendency for easy bruising

## 2021-12-23 NOTE — HISTORY OF PRESENT ILLNESS
[FreeTextEntry1] : I saw Rusty Dior In the office today for cardiac follow up. He is a 56-year-old [place]istanian male who has been treated for hypertension and hyperlipidemia. He had stopped his Lipitor. He has no history of smoking, diabetes, or family history of heart disease was walked on the treadmill about a half an hour a day.\par \par 4/19, he developed a pain in his left chest. He checked his blood pressure at 220. Took his wife's amlodipine. He became diaphoretic and went to the emergency room at [facility]. ECG showed acute inferior wall myocardial infarction. He was transferred to [place]. Cardiac catheterization demonstrated  99% lesion of OM 2 treated with drug-eluting stents. Had a 50% mid LAD and 50% mid circumflex lesion. He had a non-dominant right. Echo showed ejection fraction of 60% with inferior hypocontractility. There were no complications in the hospital and he was discharged on aspirin and Brilinta. At home he has been feeling fatigued but has no recurrent chest pain.\par \par Last visit I changed him from enalapril 40 once a day to 20 mg twice a day, and metoprolol 100 mg once a day to 50 mg twice a day. We discussed good lifestyle. He is to monitor his blood pressure home and get normal readings.\par \par A nuclear stress test performed 2/20 was normal workload of 9 mets. Ejection fraction 70%. Blood work done in January 2020 showed an elevated PSA of 9.24. His seeing a urologist. Recent blood work performed 12/21 demonstrated a total cholesterol of 117, triglycerides 160, HDL 34, and LDL 59.  Glucose was 123.  PSA 9.45.. Has a chronically elevated white blood cell count 11,780.  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 In March he thought he had a viral infection although he tested negative antibody. Now feeling better less fatigued. He is having no chest pain or shortness of breath.\par \par Patient shoveled snow last week. He had no symptoms while he was doing it.The day after and for several days he had some soreness in his left chest that was sustained  and is gradually getting better. This most likely is musculoskeletal.  Underwent a stress test 2/21 that was normal at workload of 10 METS.  There was hypertension during exercise with a peak blood pressure of 170/100.  He has been monitoring his blood pressure at home and has been in the 150s consistently.\par \par Last visit I changed his diuretic from indapamide to hydrochlorothiazide.  Blood pressure at home is still running about 150/80.  Patient has not been able to lose any weight.

## 2022-01-24 ENCOUNTER — RX RENEWAL (OUTPATIENT)
Age: 57
End: 2022-01-24

## 2022-01-26 ENCOUNTER — RX RENEWAL (OUTPATIENT)
Age: 57
End: 2022-01-26

## 2022-04-19 NOTE — PROVIDER CONTACT NOTE (CRITICAL VALUE NOTIFICATION) - PERSON GIVING RESULT:
Matilde sage, lab Elidel Counseling: Patient may experience a mild burning sensation during topical application. Elidel is not approved in children less than 2 years of age. There have been case reports of hematologic and skin malignancies in patients using topical calcineurin inhibitors although causality is questionable.

## 2022-05-18 ENCOUNTER — NON-APPOINTMENT (OUTPATIENT)
Age: 57
End: 2022-05-18

## 2022-10-28 ENCOUNTER — RX RENEWAL (OUTPATIENT)
Age: 57
End: 2022-10-28

## 2022-11-03 ENCOUNTER — NON-APPOINTMENT (OUTPATIENT)
Age: 57
End: 2022-11-03

## 2022-11-30 ENCOUNTER — RX CHANGE (OUTPATIENT)
Age: 57
End: 2022-11-30

## 2022-12-07 ENCOUNTER — RX RENEWAL (OUTPATIENT)
Age: 57
End: 2022-12-07

## 2022-12-07 ENCOUNTER — APPOINTMENT (OUTPATIENT)
Dept: CARDIOLOGY | Facility: CLINIC | Age: 57
End: 2022-12-07

## 2022-12-07 ENCOUNTER — NON-APPOINTMENT (OUTPATIENT)
Age: 57
End: 2022-12-07

## 2022-12-07 VITALS
HEIGHT: 65 IN | BODY MASS INDEX: 31.65 KG/M2 | SYSTOLIC BLOOD PRESSURE: 158 MMHG | HEART RATE: 79 BPM | WEIGHT: 190 LBS | OXYGEN SATURATION: 98 % | DIASTOLIC BLOOD PRESSURE: 85 MMHG

## 2022-12-07 VITALS — SYSTOLIC BLOOD PRESSURE: 142 MMHG | DIASTOLIC BLOOD PRESSURE: 82 MMHG

## 2022-12-07 DIAGNOSIS — E11.9 TYPE 2 DIABETES MELLITUS W/OUT COMPLICATIONS: ICD-10-CM

## 2022-12-07 DIAGNOSIS — I25.2 OLD MYOCARDIAL INFARCTION: ICD-10-CM

## 2022-12-07 PROCEDURE — 93000 ELECTROCARDIOGRAM COMPLETE: CPT

## 2022-12-07 PROCEDURE — 99214 OFFICE O/P EST MOD 30 MIN: CPT | Mod: 25

## 2022-12-07 RX ORDER — HYDROCHLOROTHIAZIDE 25 MG/1
25 TABLET ORAL DAILY
Qty: 30 | Refills: 5 | Status: DISCONTINUED | COMMUNITY
End: 2022-12-07

## 2022-12-08 NOTE — END OF VISIT
[FreeTextEntry3] : I saw and evaluated the patient and discussed the care with the NP provider above on 12/07/2022 . I agree with the findings and plan as documented in the note above. He will get a 2d echo to assess for any  new structural heart disease, changes in valvular and ventricular function.

## 2022-12-08 NOTE — HISTORY OF PRESENT ILLNESS
[FreeTextEntry1] :  Rusty iDor is a 57-year-old  male who has been treated for hypertension and hyperlipidemia.He has no history of smoking, diabetes, or family history of heart disease was walked on the treadmill about a half an hour a day.\par \par In 4/2019, he developed a pain in his left chest. He checked his blood pressure at 220. Took his wife's amlodipine. He became diaphoretic and went to the emergency room at [facility]. ECG showed acute inferior wall myocardial infarction. He was transferred to [place]. Cardiac catheterization demonstrated  99% lesion of OM 2 treated with drug-eluting stents. Had a 50% mid LAD and 50% mid circumflex lesion. He had a non-dominant right. Echo showed ejection fraction of 60% with inferior hypocontractility. There were no complications in the hospital and he was discharged on aspirin and Brilinta. At home he has been feeling fatigued but has no recurrent chest pain.\par \par He presents today to establish care with me, he was previously followed by Dr Leonard.  Last office visit in December 2021.\par He presents feeling well overall. He denies chest pains or pressure. He  denies dizzy spells, feeling faint or fainting, He   denies palpitations or difficulty breathing while laying flat. He denies lower extremity swelling.\par He has been experiencing itching of the arms and legs for a few months now, likely related to dry skin.  He denies the development of rash, denies the use of different soaps, detergents , denies new foods, drinks or over the counter supplements. \par \par He has followed up with his PCP recently whom decreased his dose of enalapril to once a day.  He is not sure of the reason.\par He has been trying to exercise regularly.  He walks on the treadmill daily for 40 minutes at a time at a speed of 4 miles an hour.  He denies any concerning symptoms.\par \par \par

## 2022-12-08 NOTE — CARDIOLOGY SUMMARY
[Normal] : normal [No Ischemia] : no Ischemia [___] : [unfilled] [LVEF ___%] : LVEF [unfilled]% [de-identified] : Sinus rhythm 76\par Low voltage ii, iii aVF Q AVF [de-identified] : Nuclear\par February 2021\par 10 METS [de-identified] : April 2019\par Mild hypocontractility of inferior lateral wall LVEF 60%

## 2022-12-08 NOTE — REVIEW OF SYSTEMS
[Negative] : Genitourinary [Itching] : itching [Joint Pain] : no joint pain [Rash] : no rash [Dizziness] : no dizziness [Depression] : no depression [Anxiety] : no anxiety [Easy Bleeding] : no tendency for easy bleeding [Easy Bruising] : no tendency for easy bruising [de-identified] : See HPI

## 2022-12-08 NOTE — PHYSICAL EXAM
[Well Developed] : well developed [Well Nourished] : well nourished [No Acute Distress] : no acute distress [Normal Venous Pressure] : normal venous pressure [No Carotid Bruit] : no carotid bruit [Normal S1, S2] : normal S1, S2 [No Rub] : no rub [No Gallop] : no gallop [Clear Lung Fields] : clear lung fields [Good Air Entry] : good air entry [No Respiratory Distress] : no respiratory distress  [Soft] : abdomen soft [Non Tender] : non-tender [No Masses/organomegaly] : no masses/organomegaly [Normal Bowel Sounds] : normal bowel sounds [Normal Gait] : normal gait [No Edema] : no edema [No Cyanosis] : no cyanosis [No Clubbing] : no clubbing [No Varicosities] : no varicosities [No Rash] : no rash [No Skin Lesions] : no skin lesions [Moves all extremities] : moves all extremities [No Focal Deficits] : no focal deficits [Normal Speech] : normal speech [Alert and Oriented] : alert and oriented [Normal memory] : normal memory [General Appearance - Well Developed] : well developed [Normal Appearance] : normal appearance [Well Groomed] : well groomed [General Appearance - Well Nourished] : well nourished [No Deformities] : no deformities [General Appearance - In No Acute Distress] : no acute distress [Normal Conjunctiva] : the conjunctiva exhibited no abnormalities [Normal Oral Mucosa] : normal oral mucosa [No Oral Pallor] : no oral pallor [No Oral Cyanosis] : no oral cyanosis [Normal Jugular Venous A Waves Present] : normal jugular venous A waves present [Normal Jugular Venous V Waves Present] : normal jugular venous V waves present [No Jugular Venous Hatch A Waves] : no jugular venous hatch A waves [Respiration, Rhythm And Depth] : normal respiratory rhythm and effort [Exaggerated Use Of Accessory Muscles For Inspiration] : no accessory muscle use [Auscultation Breath Sounds / Voice Sounds] : lungs were clear to auscultation bilaterally [Bowel Sounds] : normal bowel sounds [Abdomen Soft] : soft [Abdomen Tenderness] : non-tender [Abnormal Walk] : normal gait [Gait - Sufficient For Exercise Testing] : the gait was sufficient for exercise testing [Nail Clubbing] : no clubbing of the fingernails [Cyanosis, Localized] : no localized cyanosis [Skin Color & Pigmentation] : normal skin color and pigmentation [Skin Turgor] : normal skin turgor [] : no rash [Oriented To Time, Place, And Person] : oriented to person, place, and time [Impaired Insight] : insight and judgment were intact [No Anxiety] : not feeling anxious [Normal Rate] : normal [2+] : left 2+ [1+] : left 1+ [Rhythm Regular] : regular [Normal S1] : normal S1 [Normal S2] : normal S2 [No Murmur] : no murmurs heard [No Pitting Edema] : no pitting edema present [No Abnormalities] : the abdominal aorta was not enlarged and no bruit was heard [de-identified] : itching  [S3] : no S3 [S4] : no S4 [Right Carotid Bruit] : no bruit heard over the right carotid [Left Carotid Bruit] : no bruit heard over the left carotid [Right Femoral Bruit] : no bruit heard over the right femoral artery [Left Femoral Bruit] : no bruit heard over the left femoral artery

## 2022-12-08 NOTE — DISCUSSION/SUMMARY
[FreeTextEntry1] : Rusty Dior is a 57-year-old Irish male who has been treated for hypertension and hyperlipidemia.He has no history of smoking, diabetes, or family history of heart disease. S/P Inferior wall myocardial infarction 2019-Cardiac catheterization demonstrated  99% lesion of OM 2 treated with drug-eluting stents. Had a 50% mid LAD and 50% mid circumflex lesion. \par \par Clinically the patient is doing well. He is in no acute distress.  He has no chest pain, shortness of breath, palpitation.  He is euvolemic on exam.  ECG illustrates sinus rhythm low voltage and leads II,III,AVF, Q AVF unchanged from previous.\par His blood pressure however is not optimal despite resting a while in the office.\par \par We discussed his medications, his most recent blood work at length.\par For better blood pressure control and kidney protection I have asked that he resume taking enalapril 20 mg twice a day, continue amlodipine 2.5 mg once a day and metoprolol 50 mg twice a day. \par \par His most recent LDL was 72, he will continue atorvastatin 80 mg.  Goal LDL ideally less than 70. \par  He does have a remaining 50% disease of his mid LAD and mid left circumflex. He will continue Plavix 75 mg daily for atherosclerotic disease and secondary prevention.  \par \par His triglycerides have also been on the higher side of normal.  He will continue fish oil twice a day.\par  His most recent hemoglobin A1c is 6.3.  His most recent creatinine was normal at 0.94 ,he will start metformin 500 mg twice a day with the intent to assist with lowering his glucose levels as well as weight loss.\par \par Again lifestyle modifications were emphasized, including diet and exercise.\par \par He will follow-up again in 3 months, at that time will likely repeat an echocardiogram to assess for any changes in cardiac structure and/or LV function.  \par \par

## 2022-12-09 RX ORDER — ATORVASTATIN CALCIUM 80 MG/1
80 TABLET, FILM COATED ORAL
Qty: 90 | Refills: 3 | Status: ACTIVE | COMMUNITY
Start: 2020-06-22

## 2022-12-27 ENCOUNTER — TRANSCRIPTION ENCOUNTER (OUTPATIENT)
Age: 57
End: 2022-12-27

## 2022-12-28 ENCOUNTER — RX RENEWAL (OUTPATIENT)
Age: 57
End: 2022-12-28

## 2023-02-04 ENCOUNTER — EMERGENCY (EMERGENCY)
Facility: HOSPITAL | Age: 58
LOS: 1 days | Discharge: LEFT BEFORE DISCHARGE | End: 2023-02-04
Attending: STUDENT IN AN ORGANIZED HEALTH CARE EDUCATION/TRAINING PROGRAM | Admitting: STUDENT IN AN ORGANIZED HEALTH CARE EDUCATION/TRAINING PROGRAM
Payer: MEDICAID

## 2023-02-04 VITALS
WEIGHT: 186.73 LBS | RESPIRATION RATE: 18 BRPM | DIASTOLIC BLOOD PRESSURE: 75 MMHG | HEIGHT: 64 IN | SYSTOLIC BLOOD PRESSURE: 164 MMHG | OXYGEN SATURATION: 100 % | HEART RATE: 81 BPM | TEMPERATURE: 98 F

## 2023-02-04 PROCEDURE — L9992: CPT

## 2023-02-04 NOTE — ED ADULT NURSE NOTE - OBJECTIVE STATEMENT
I was eating dinner at 10pm; I had 2 shots of whiskey and ate some fish then I felt like my tongue was swollen; I feel better now. No swelling noted at this time

## 2023-02-04 NOTE — ED ADULT TRIAGE NOTE - BP NONINVASIVE DIASTOLIC (MM HG)
75 Patient is a 74 yo F with chronic diastolic heart failure, HTN, OA, who is admitted for respiratory failure. Rheumatology is consulted due to concern for vasculitis. Patient presented with cough and hemoptysis since 9/24/22. She was admitted due to dyspnea and hypoxia on 10/17. She has had Hb drop to 6 this admission requiring blood transfusions. Reviewed her chest imaging which shows progressive disease from 10/14 until now which can be concerning for DAH. This might also explain the Hb drop. No bronch planned for now due to her tenuous respiratory status. GI defers invasive workup for now because she is not stable enough. She has had increasing creatinine from baseline of 1 to 3.0 on 10/23/22. Nephrology concerned for ATN nephritic picture in urine sediment. Pending kidney biopsy result.    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 Now following PEXIVAS steroid taper. Currently on IV Solumedrol 24 mg daily.  - PLEX 10/26, 10/27, 10/29. Next scheduled for 10/30. Total 7 treatments planned.  - Got SLED 10/27. Bedside HD 10/30.  - Rituximab 375 mg/m^2 (600 mg) on 10/27 (every 7 day dose 1 of 4)  - Cyclophosphamide 7.5 mg/kg on 10/27 (every 14 day dose 1 of 2)    Recommendations:  1. Continue steroid taper per PEXIVAS trial as in the chart below. Currently IV Solu-Medrol 24 mg daily (prednisone 30 mg daily equivalent).  2. Atovaquone 1500 mg and Protonix daily while on high dose steroids.  3. Next Rituximab 275 mg/m^2 due on November 3  4. Next cyclophosphamide 7.5 mg/kg due on November 10  5. Monitor CBC and CMP in 10-14 days after giving  chemotherapy  6. Follow up kidney biopsy          Discussed with attending physician, Dr. Palacio. Attending attestation to follow.    Layne Aguirre MD  Rheumatology Fellow  PGY-5

## 2023-03-30 ENCOUNTER — APPOINTMENT (OUTPATIENT)
Dept: CARDIOLOGY | Facility: CLINIC | Age: 58
End: 2023-03-30
Payer: MEDICAID

## 2023-03-30 ENCOUNTER — NON-APPOINTMENT (OUTPATIENT)
Age: 58
End: 2023-03-30

## 2023-03-30 VITALS
BODY MASS INDEX: 31.49 KG/M2 | WEIGHT: 189 LBS | SYSTOLIC BLOOD PRESSURE: 163 MMHG | DIASTOLIC BLOOD PRESSURE: 84 MMHG | OXYGEN SATURATION: 100 % | HEART RATE: 64 BPM | HEIGHT: 65 IN

## 2023-03-30 PROCEDURE — 99214 OFFICE O/P EST MOD 30 MIN: CPT | Mod: 25

## 2023-03-30 PROCEDURE — 93000 ELECTROCARDIOGRAM COMPLETE: CPT

## 2023-03-30 NOTE — CARDIOLOGY SUMMARY
[de-identified] : SR  [de-identified] : Nuclear February 2021 10 METS [de-identified] : April 2019 Mild hypocontractility of inferior lateral wall LVEF 60% [Normal] : normal [No Ischemia] : no Ischemia [___] : [unfilled] [LVEF ___%] : LVEF [unfilled]%

## 2023-03-30 NOTE — DISCUSSION/SUMMARY
[FreeTextEntry1] : 57-year-old Swazi male who has been treated for hypertension and hyperlipidemia.\par He has no history of smoking, diabetes, or family history of heart disease. S/P Inferior wall myocardial infarction 2019-Cardiac catheterization demonstrated  99% lesion of OM 2 treated with drug-eluting stents. Had a 50% mid LAD and 50% mid circumflex lesion. \par \par Rusty is doing well overall. He denies any anginal symptoms. Clinically he is euvolemic on exam. His EKG did not reveal any significant ischemic changes.  He does have a remaining 50% disease of his mid LAD and mid left circumflex. He will continue Plavix 75 mg daily for atherosclerotic disease and secondary prevention.  Repeat echo givne his previous LV function. \par \par His blood pressure however is not optimal despite resting a while in the office. He did not increase the enalapril. I will switch the Toprol to Coreg 25mg q12. He will continue Norvasc. He will get a 24 hour ambulatory BP monitor to rule out white coat hypertension and to better assess her overall BP during the day. \par \par His triglycerides have also been on the higher side of normal.  He will continue fish oil twice a day.\par \par he will get HST for MORIS. \par \par Again lifestyle modifications were emphasized, including diet and exercise.\par \par He will follow-up again in 3 months, at that time will likely repeat an echocardiogram to assess for any changes in cardiac structure and/or LV function.  \par \par  [EKG obtained to assist in diagnosis and management of assessed problem(s)] : EKG obtained to assist in diagnosis and management of assessed problem(s)

## 2023-03-30 NOTE — PHYSICAL EXAM
[Well Developed] : well developed [Well Nourished] : well nourished [No Acute Distress] : no acute distress [Normal Venous Pressure] : normal venous pressure [No Carotid Bruit] : no carotid bruit [Normal S1, S2] : normal S1, S2 [No Rub] : no rub [No Gallop] : no gallop [Rhythm Regular] : regular [Clear Lung Fields] : clear lung fields [Good Air Entry] : good air entry [No Respiratory Distress] : no respiratory distress  [Soft] : abdomen soft [Non Tender] : non-tender [No Masses/organomegaly] : no masses/organomegaly [Normal Bowel Sounds] : normal bowel sounds [Normal Gait] : normal gait [No Edema] : no edema [No Cyanosis] : no cyanosis [No Clubbing] : no clubbing [No Varicosities] : no varicosities [No Rash] : no rash [No Skin Lesions] : no skin lesions [Moves all extremities] : moves all extremities [No Focal Deficits] : no focal deficits [Normal Speech] : normal speech [Alert and Oriented] : alert and oriented [Normal memory] : normal memory [de-identified] : itching  [General Appearance - Well Developed] : well developed [Normal Appearance] : normal appearance [Well Groomed] : well groomed [General Appearance - Well Nourished] : well nourished [No Deformities] : no deformities [General Appearance - In No Acute Distress] : no acute distress [Normal Conjunctiva] : the conjunctiva exhibited no abnormalities [Normal Oral Mucosa] : normal oral mucosa [No Oral Pallor] : no oral pallor [No Oral Cyanosis] : no oral cyanosis [Normal Jugular Venous A Waves Present] : normal jugular venous A waves present [Normal Jugular Venous V Waves Present] : normal jugular venous V waves present [No Jugular Venous Hatch A Waves] : no jugular venous hatch A waves [Respiration, Rhythm And Depth] : normal respiratory rhythm and effort [Exaggerated Use Of Accessory Muscles For Inspiration] : no accessory muscle use [Auscultation Breath Sounds / Voice Sounds] : lungs were clear to auscultation bilaterally [Bowel Sounds] : normal bowel sounds [Abdomen Soft] : soft [Abdomen Tenderness] : non-tender [Abnormal Walk] : normal gait [Gait - Sufficient For Exercise Testing] : the gait was sufficient for exercise testing [Nail Clubbing] : no clubbing of the fingernails [Cyanosis, Localized] : no localized cyanosis [Skin Color & Pigmentation] : normal skin color and pigmentation [Skin Turgor] : normal skin turgor [] : no rash [Oriented To Time, Place, And Person] : oriented to person, place, and time [Impaired Insight] : insight and judgment were intact [No Anxiety] : not feeling anxious [Normal Rate] : normal [Normal S1] : normal S1 [Normal S2] : normal S2 [S3] : no S3 [S4] : no S4 [No Murmur] : no murmurs heard [2+] : left 2+ [Right Carotid Bruit] : no bruit heard over the right carotid [Left Carotid Bruit] : no bruit heard over the left carotid [Right Femoral Bruit] : no bruit heard over the right femoral artery [Left Femoral Bruit] : no bruit heard over the left femoral artery [1+] : left 1+ [No Abnormalities] : the abdominal aorta was not enlarged and no bruit was heard [No Pitting Edema] : no pitting edema present

## 2023-03-30 NOTE — REASON FOR VISIT
[Symptom and Test Evaluation] : symptom and test evaluation [FreeTextEntry1] : \par  [Follow-Up - Clinic] : a clinic follow-up of [Coronary Artery Disease] : coronary artery disease [Hyperlipidemia] : hyperlipidemia [Hypertension] : hypertension

## 2023-03-30 NOTE — HISTORY OF PRESENT ILLNESS
[FreeTextEntry1] : 57-year-old Monegasque male who has been treated for hypertension and hyperlipidemia.He has no history of smoking, diabetes, or family history of heart disease was walked on the treadmill about a half an hour a day.\par \par In 4/2019, he developed a pain in his left chest. He checked his blood pressure at 220. Took his wife's amlodipine. He became diaphoretic and went to the emergency room at [facility]. ECG showed acute inferior wall myocardial infarction. He was transferred to [facility]. Cardiac catheterization demonstrated  99% lesion of OM 2 treated with drug-eluting stents. Had a 50% mid LAD and 50% mid circumflex lesion. He had a non-dominant right. Echo showed ejection fraction of 60% with inferior hypocontractility. There were no complications in the hospital and he was discharged on aspirin and Brilinta. At home he has been feeling fatigued but has no recurrent chest pain.\par \par He presents today is doing well. \par \par He   denies any chest pain, PND, orthopnea, lower extremity edema, near syncope, syncope, strokelike symptoms. \par He has been experiencing itching of the arms and legs for a few months now, likely related to dry skin. \par He has been trying to exercise regularly.  He walks on the treadmill daily for 40 minutes at a time at a speed of 4 miles an hour.  He denies any concerning symptoms.\par Medication reconciliation performed. He is compliant with his medications. He never increased the enalapril to Q12. \par \par

## 2023-03-30 NOTE — REVIEW OF SYSTEMS
[Joint Pain] : no joint pain [Rash] : no rash [Itching] : itching [Dizziness] : no dizziness [Depression] : no depression [Anxiety] : no anxiety [Easy Bleeding] : no tendency for easy bleeding [Easy Bruising] : no tendency for easy bruising [Negative] : Genitourinary [de-identified] : See HPI

## 2023-04-05 ENCOUNTER — APPOINTMENT (OUTPATIENT)
Dept: CARDIOLOGY | Facility: CLINIC | Age: 58
End: 2023-04-05
Payer: MEDICAID

## 2023-04-05 PROCEDURE — 95800 SLP STDY UNATTENDED: CPT

## 2023-04-05 PROCEDURE — 95800 SLP STDY UNATTENDED: CPT | Mod: TC

## 2023-04-05 RX ORDER — METFORMIN HYDROCHLORIDE 500 MG/1
500 TABLET, COATED ORAL
Qty: 180 | Refills: 3 | Status: ACTIVE | COMMUNITY
Start: 2022-12-07

## 2023-04-23 ENCOUNTER — FORM ENCOUNTER (OUTPATIENT)
Age: 58
End: 2023-04-23

## 2023-04-30 ENCOUNTER — TRANSCRIPTION ENCOUNTER (OUTPATIENT)
Age: 58
End: 2023-04-30

## 2023-05-23 NOTE — ED ADULT NURSE NOTE - NS PRO AD NO ADVANCE DIRECTIVE
Department of Anesthesiology  Postprocedure Note    Patient: Aissatou Pearce  MRN: [id number]  YOB: 1973  Date of evaluation: 5/23/2023      Procedure Summary     Date: 05/23/23 Room / Location: [place] [address] / [facility]    Anesthesia Start: 5899 Anesthesia Stop: 1016    Procedures:       ESOPHAGOGASTRODUODENOSCOPY with radio frequency ablation      EGD BIOPSY Diagnosis:       Villela's esophagus without dysplasia      (Villela's esophagus without dysplasia [K22.70])    Surgeons: Juno Kamara MD Responsible Provider: Ghislaine Das MD    Anesthesia Type: MAC ASA Status: 3          Anesthesia Type: No value filed. Salomon Phase I: Salomon Score: 10    Salomon Phase II: Salomon Score: 10      Anesthesia Post Evaluation    Patient location during evaluation: PACU  Patient participation: complete - patient participated  Level of consciousness: awake  Pain score: 5  Airway patency: patent  Nausea & Vomiting: no nausea  Complications: no  Cardiovascular status: hemodynamically stable  Respiratory status: acceptable  Hydration status: stable  Comments: C/O chest pain. Seen by Leeanne Rueda.   EKG WNL No

## 2023-06-15 ENCOUNTER — APPOINTMENT (OUTPATIENT)
Dept: PULMONOLOGY | Facility: CLINIC | Age: 58
End: 2023-06-15

## 2023-06-15 NOTE — HISTORY OF PRESENT ILLNESS
[FreeTextEntry1] : 58 yo M presents for initial evaluation of sleep disordered breathing\par Referred by Dr. Pepito Blackwell. \par PMH: CAD, HTN, HLD\par Watchpat HST 4/24/23 - moderate MORIS, pAHI 29.7/hr and associated with severe oxygen desaturations\par \par Sleep history: \par Main complaints of nonrestorative sleep, severe snoring and daytime somnolence.\par Bed: ______pm, no difficultly falling asleep, wakes ______to urinate, out of bed at ______ \par Unrefreshed upon awakening. \par Morning headaches: \par Dry mouth/throat: \par Weight trend:\par ? drowsy driving, denies any accidents or near accidents. \par EDS with ESS of ____\par \par Quality Metrics:\par Smoking history:\par ESS:\par \par Vaccines: \par COVID:\par Influenza:\par Pneumococcal:\par

## 2023-07-14 ENCOUNTER — NON-APPOINTMENT (OUTPATIENT)
Age: 58
End: 2023-07-14

## 2023-07-14 ENCOUNTER — APPOINTMENT (OUTPATIENT)
Dept: CARDIOLOGY | Facility: CLINIC | Age: 58
End: 2023-07-14
Payer: MEDICAID

## 2023-07-14 VITALS
OXYGEN SATURATION: 97 % | WEIGHT: 196 LBS | DIASTOLIC BLOOD PRESSURE: 88 MMHG | HEART RATE: 68 BPM | BODY MASS INDEX: 32.65 KG/M2 | HEIGHT: 65 IN | SYSTOLIC BLOOD PRESSURE: 176 MMHG

## 2023-07-14 DIAGNOSIS — R07.89 OTHER CHEST PAIN: ICD-10-CM

## 2023-07-14 PROCEDURE — 93000 ELECTROCARDIOGRAM COMPLETE: CPT

## 2023-07-14 PROCEDURE — 99215 OFFICE O/P EST HI 40 MIN: CPT | Mod: 25

## 2023-07-14 RX ORDER — OMEGA-3/DHA/EPA/FISH OIL 300-1000MG
1000 CAPSULE ORAL
Refills: 0 | Status: DISCONTINUED | COMMUNITY
End: 2023-07-14

## 2023-07-14 NOTE — REVIEW OF SYSTEMS
[Joint Pain] : no joint pain [Rash] : no rash [Itching] : itching [Dizziness] : no dizziness [Depression] : no depression [Anxiety] : no anxiety [Easy Bleeding] : no tendency for easy bleeding [Easy Bruising] : no tendency for easy bruising [Negative] : Genitourinary [de-identified] : See HPI

## 2023-07-14 NOTE — CARDIOLOGY SUMMARY
[de-identified] : SR  [de-identified] : Nuclear February 2021 10 METS [de-identified] : April 2019 Mild hypocontractility of inferior lateral wall LVEF 60% [de-identified] : 4/2019  Cardiac catheterization demonstrated  99% lesion of OM 2 treated with drug-eluting stents. Had a 50% mid LAD and 50% mid circumflex lesion. He had a non-dominant right. [Normal] : normal [No Ischemia] : no Ischemia [___] : [unfilled] [LVEF ___%] : LVEF [unfilled]%

## 2023-07-14 NOTE — DISCUSSION/SUMMARY
[FreeTextEntry1] : 57 year man with a history as listed presents for a followup cardiac evaluation. \par Rusty is complaining of atypical chest pain. His EKG did not reveal any significant ischemic changes. He will undergo a nuclear stress test to assess for underlying obstructive CAD. He will get a 2d echo to assess for any  new structural heart disease, changes in valvular and ventricular function. \par  He will continue Plavix 75 mg daily for atherosclerotic disease and secondary prevention.   \par \par His blood pressure is uncontrolled.  He did not increase the enalapril. He will continue  Coreg 25mg q12. and increase the Enalapril 20mg q12. He will try to maintain a BP log at home. Reducing dietary salt intake advised. \par \par He will continue with statin therapy to achieve maintain goal LDL<100 or ideally <70. His triglycerides have also been on the higher side of normal.  He will continue fish oil twice a day.\par \par He has MORIS on HST. Will followup with pulm. \par \par Exercise and diet counseling was performed in order to reduce her future cardiovascular risk.\par He will followup with me in 3 months or sooner if necessary. \par  [EKG obtained to assist in diagnosis and management of assessed problem(s)] : EKG obtained to assist in diagnosis and management of assessed problem(s)

## 2023-07-14 NOTE — PHYSICAL EXAM
[Well Developed] : well developed [Well Nourished] : well nourished [No Acute Distress] : no acute distress [Normal Venous Pressure] : normal venous pressure [No Carotid Bruit] : no carotid bruit [Normal S1, S2] : normal S1, S2 [No Rub] : no rub [No Gallop] : no gallop [Rhythm Regular] : regular [Clear Lung Fields] : clear lung fields [Good Air Entry] : good air entry [No Respiratory Distress] : no respiratory distress  [Soft] : abdomen soft [Non Tender] : non-tender [No Masses/organomegaly] : no masses/organomegaly [Normal Bowel Sounds] : normal bowel sounds [Normal Gait] : normal gait [No Edema] : no edema [No Cyanosis] : no cyanosis [No Clubbing] : no clubbing [No Varicosities] : no varicosities [No Rash] : no rash [No Skin Lesions] : no skin lesions [Moves all extremities] : moves all extremities [No Focal Deficits] : no focal deficits [Normal Speech] : normal speech [Alert and Oriented] : alert and oriented [Normal memory] : normal memory [de-identified] : itching  [General Appearance - Well Developed] : well developed [Normal Appearance] : normal appearance [Well Groomed] : well groomed [General Appearance - Well Nourished] : well nourished [No Deformities] : no deformities [General Appearance - In No Acute Distress] : no acute distress [Normal Conjunctiva] : the conjunctiva exhibited no abnormalities [Normal Oral Mucosa] : normal oral mucosa [No Oral Pallor] : no oral pallor [No Oral Cyanosis] : no oral cyanosis [Normal Jugular Venous A Waves Present] : normal jugular venous A waves present [Normal Jugular Venous V Waves Present] : normal jugular venous V waves present [No Jugular Venous Hatch A Waves] : no jugular venous hatch A waves [Respiration, Rhythm And Depth] : normal respiratory rhythm and effort [Exaggerated Use Of Accessory Muscles For Inspiration] : no accessory muscle use [Auscultation Breath Sounds / Voice Sounds] : lungs were clear to auscultation bilaterally [Bowel Sounds] : normal bowel sounds [Abdomen Soft] : soft [Abdomen Tenderness] : non-tender [Abnormal Walk] : normal gait [Gait - Sufficient For Exercise Testing] : the gait was sufficient for exercise testing [Nail Clubbing] : no clubbing of the fingernails [Cyanosis, Localized] : no localized cyanosis [Skin Color & Pigmentation] : normal skin color and pigmentation [Skin Turgor] : normal skin turgor [] : no rash [Oriented To Time, Place, And Person] : oriented to person, place, and time [Impaired Insight] : insight and judgment were intact [No Anxiety] : not feeling anxious [Normal Rate] : normal [Normal S1] : normal S1 [Normal S2] : normal S2 [S3] : no S3 [S4] : no S4 [No Murmur] : no murmurs heard [2+] : left 2+ [Right Carotid Bruit] : no bruit heard over the right carotid [Left Carotid Bruit] : no bruit heard over the left carotid [Right Femoral Bruit] : no bruit heard over the right femoral artery [Left Femoral Bruit] : no bruit heard over the left femoral artery [1+] : left 1+ [No Abnormalities] : the abdominal aorta was not enlarged and no bruit was heard [No Pitting Edema] : no pitting edema present

## 2023-07-14 NOTE — HISTORY OF PRESENT ILLNESS
[FreeTextEntry1] : 57-year-old  male with history of HTN, HLD, CAD s/p PCI, DM present for a followup visit. \par  \par He presents today is doing well. \par Since his last visit, he has been complaining of of a nondescript chest pressure when walking in the evening that improves with rest. He has been trying to exercise regularly.  He walks on the treadmill daily for 40 minutes at a time at a speed of 4 miles an hour. He does not that he does not feel it when exercising on the treadmill. \par He   denies any PND, orthopnea, lower extremity edema, near syncope, syncope, strokelike symptoms. \par Medication reconciliation performed. He is compliant with his medications. He has stopped the Norvasc and is only taking the enalapril Qday. \par \par

## 2023-07-17 ENCOUNTER — APPOINTMENT (OUTPATIENT)
Dept: PULMONOLOGY | Facility: CLINIC | Age: 58
End: 2023-07-17
Payer: MEDICAID

## 2023-07-17 VITALS
SYSTOLIC BLOOD PRESSURE: 192 MMHG | HEIGHT: 65 IN | HEART RATE: 69 BPM | BODY MASS INDEX: 31.99 KG/M2 | OXYGEN SATURATION: 97 % | WEIGHT: 192 LBS | DIASTOLIC BLOOD PRESSURE: 92 MMHG | TEMPERATURE: 97.8 F | RESPIRATION RATE: 14 BRPM

## 2023-07-17 DIAGNOSIS — G47.33 OBSTRUCTIVE SLEEP APNEA (ADULT) (PEDIATRIC): ICD-10-CM

## 2023-07-17 PROCEDURE — 99204 OFFICE O/P NEW MOD 45 MIN: CPT

## 2023-07-17 NOTE — REVIEW OF SYSTEMS
[EDS: ESS=____] : daytime somnolence: ESS=[unfilled] [Snoring] : snoring [Obesity] : obesity [Nocturia] : nocturia [A.M. Headache] : no headache present upon awakening

## 2023-07-17 NOTE — PHYSICAL EXAM
[General Appearance - Well Developed] : well developed [Normal Appearance] : normal appearance [General Appearance - Well Nourished] : well nourished [No Deformities] : no deformities [Normal Conjunctiva] : the conjunctiva exhibited no abnormalities [IV] : IV [Neck Appearance] : the appearance of the neck was normal [] : the neck was supple [Apical Impulse] : the apical impulse was normal [Heart Rate And Rhythm] : heart rate was normal and rhythm regular [Heart Sounds] : normal S1 and S2 [Heart Sounds Gallop] : no gallops [Abnormal Walk] : normal gait [Involuntary Movements] : no involuntary movements were seen [Nail Clubbing] : no clubbing of the fingernails [Cyanosis, Localized] : no localized cyanosis [Petechial Hemorrhages (___cm)] : no petechial hemorrhages [Nail Splinter Hemorrhages] : no splinter hemorrhages of the nails [Skin Color & Pigmentation] : normal skin color and pigmentation [No Focal Deficits] : no focal deficits [Oriented To Time, Place, And Person] : oriented to person, place, and time [Impaired Insight] : insight and judgment were intact [Affect] : the affect was normal [Mood] : the mood was normal

## 2023-07-17 NOTE — ASSESSMENT
[FreeTextEntry1] : This is a 57 year old male referred for management of obstructive sleep apnea.  WatchPAT HST showed moderate MORIS, close to the severe range. The ramifications of MORIS and its potential therapeutic modalities were discussed with the patient. The patient is agreeable for treatment. He was referred to the [facility] for a home APAP study. Appropriate equipment will be ordered for him following the study.

## 2023-07-17 NOTE — HISTORY OF PRESENT ILLNESS
[Snoring] : snoring [Frequent Nocturnal Awakening] : frequent nocturnal awakening [Unintentional Sleep While Inactive] : unintentional sleep while inactive [Awakes Unrefreshed] : awakening unrefreshed [Awakening With Dry Mouth] : awakening with dry mouth [Recent  Weight Gain] : recent weight gain [Daytime Somnolence] : daytime somnolence [To Bed: ___] : ~he/she~ goes to bed at [unfilled] [Arises: ___] : arises at [unfilled] [Sleep Onset Latency: ___ minutes] : sleep onset latency of [unfilled] minutes reported [Nocturnal Awakenings: ___] : ~he/she~ typically has [unfilled] nocturnal awakenings [WASO: ___] : Wake time after sleep onset is [unfilled] [TST: ___] : Total sleep time is [unfilled] [Daytime Sleep: ___] : daytime sleep: [unfilled] [FreeTextEntry1] : 57-year-old male referred for further management of obstructive sleep apnea.\par \par Medical history includes hypertension, hyperlipidemia, coronary artery disease s/p PCI, BPH.\par \par WatchPAT HST (4/24/23) pAHI 29.7/hr, T90 8.4%\par \par Sleep-related symptoms and sleep schedule are as listed below  [Witnessed Apneas] : no witnessed sleep apnea [Unintentional Sleep while Active] : no unintentional sleep while active [Awakes with Headache] : no headache upon awakening

## 2023-07-19 ENCOUNTER — APPOINTMENT (OUTPATIENT)
Dept: CARDIOLOGY | Facility: CLINIC | Age: 58
End: 2023-07-19

## 2023-07-24 ENCOUNTER — APPOINTMENT (OUTPATIENT)
Dept: CARDIOLOGY | Facility: CLINIC | Age: 58
End: 2023-07-24
Payer: MEDICAID

## 2023-07-24 PROCEDURE — 93306 TTE W/DOPPLER COMPLETE: CPT

## 2023-08-01 ENCOUNTER — APPOINTMENT (OUTPATIENT)
Dept: CARDIOLOGY | Facility: CLINIC | Age: 58
End: 2023-08-01
Payer: MEDICAID

## 2023-08-01 PROCEDURE — 93015 CV STRESS TEST SUPVJ I&R: CPT

## 2023-08-01 PROCEDURE — 78452 HT MUSCLE IMAGE SPECT MULT: CPT

## 2023-08-01 PROCEDURE — A9500: CPT

## 2023-08-02 RX ORDER — CARVEDILOL 25 MG/1
25 TABLET, FILM COATED ORAL TWICE DAILY
Qty: 180 | Refills: 2 | Status: DISCONTINUED | COMMUNITY
Start: 2023-03-30 | End: 2023-08-02

## 2023-08-03 NOTE — DISCHARGE NOTE PROVIDER - NSDCACTIVITY_GEN_ALL_CORE
Noted will look for fax   No heavy lifting/straining Showering allowed/Stairs allowed/Walking - Outdoors allowed/No heavy lifting/straining/Walking - Indoors allowed

## 2023-08-06 ENCOUNTER — TRANSCRIPTION ENCOUNTER (OUTPATIENT)
Age: 58
End: 2023-08-06

## 2023-09-12 ENCOUNTER — APPOINTMENT (OUTPATIENT)
Dept: SLEEP CENTER | Facility: CLINIC | Age: 58
End: 2023-09-12

## 2023-09-21 ENCOUNTER — APPOINTMENT (OUTPATIENT)
Dept: UROLOGY | Facility: CLINIC | Age: 58
End: 2023-09-21
Payer: MEDICAID

## 2023-09-21 VITALS
HEIGHT: 66 IN | BODY MASS INDEX: 31.02 KG/M2 | WEIGHT: 193 LBS | DIASTOLIC BLOOD PRESSURE: 89 MMHG | OXYGEN SATURATION: 98 % | HEART RATE: 78 BPM | SYSTOLIC BLOOD PRESSURE: 171 MMHG

## 2023-09-21 DIAGNOSIS — Z87.442 PERSONAL HISTORY OF URINARY CALCULI: ICD-10-CM

## 2023-09-21 DIAGNOSIS — Z95.5 PRESENCE OF CORONARY ANGIOPLASTY IMPLANT AND GRAFT: ICD-10-CM

## 2023-09-21 DIAGNOSIS — N20.0 CALCULUS OF KIDNEY: ICD-10-CM

## 2023-09-21 DIAGNOSIS — N40.0 BENIGN PROSTATIC HYPERPLASIA WITHOUT LOWER URINARY TRACT SYMPMS: ICD-10-CM

## 2023-09-21 PROCEDURE — 99204 OFFICE O/P NEW MOD 45 MIN: CPT

## 2023-09-22 LAB
APPEARANCE: CLEAR
BACTERIA: NEGATIVE /HPF
BILIRUBIN URINE: NEGATIVE
BLOOD URINE: ABNORMAL
CAST: 0 /LPF
COLOR: YELLOW
EPITHELIAL CELLS: 0 /HPF
GLUCOSE QUALITATIVE U: NEGATIVE MG/DL
KETONES URINE: NEGATIVE MG/DL
LEUKOCYTE ESTERASE URINE: ABNORMAL
MICROSCOPIC-UA: NORMAL
NITRITE URINE: NEGATIVE
PH URINE: 6.5
PROTEIN URINE: NEGATIVE MG/DL
RED BLOOD CELLS URINE: 4 /HPF
SPECIFIC GRAVITY URINE: 1.01
UROBILINOGEN URINE: 0.2 MG/DL
WHITE BLOOD CELLS URINE: 0 /HPF

## 2023-09-23 LAB — BACTERIA UR CULT: NORMAL

## 2023-09-25 ENCOUNTER — RESULT REVIEW (OUTPATIENT)
Age: 58
End: 2023-09-25

## 2023-09-26 ENCOUNTER — APPOINTMENT (OUTPATIENT)
Dept: PULMONOLOGY | Facility: CLINIC | Age: 58
End: 2023-09-26

## 2023-09-27 LAB — URINE CYTOLOGY: NORMAL

## 2023-09-28 ENCOUNTER — OUTPATIENT (OUTPATIENT)
Dept: OUTPATIENT SERVICES | Facility: HOSPITAL | Age: 58
LOS: 1 days | End: 2023-09-28
Payer: MEDICAID

## 2023-09-28 ENCOUNTER — APPOINTMENT (OUTPATIENT)
Dept: CT IMAGING | Facility: CLINIC | Age: 58
End: 2023-09-28
Payer: MEDICAID

## 2023-09-28 DIAGNOSIS — R31.0 GROSS HEMATURIA: ICD-10-CM

## 2023-09-28 DIAGNOSIS — Z87.442 PERSONAL HISTORY OF URINARY CALCULI: ICD-10-CM

## 2023-09-28 PROCEDURE — 74178 CT ABD&PLV WO CNTR FLWD CNTR: CPT | Mod: 26

## 2023-09-28 PROCEDURE — 74178 CT ABD&PLV WO CNTR FLWD CNTR: CPT

## 2023-10-05 ENCOUNTER — TRANSCRIPTION ENCOUNTER (OUTPATIENT)
Age: 58
End: 2023-10-05

## 2023-10-13 ENCOUNTER — APPOINTMENT (OUTPATIENT)
Dept: UROLOGY | Facility: CLINIC | Age: 58
End: 2023-10-13
Payer: COMMERCIAL

## 2023-10-13 DIAGNOSIS — R97.20 ELEVATED PROSTATE, SPECIFIC ANTIGEN [PSA]: ICD-10-CM

## 2023-10-13 DIAGNOSIS — N28.1 CYST OF KIDNEY, ACQUIRED: ICD-10-CM

## 2023-10-13 DIAGNOSIS — R31.0 GROSS HEMATURIA: ICD-10-CM

## 2023-10-13 PROCEDURE — 99214 OFFICE O/P EST MOD 30 MIN: CPT

## 2023-10-19 ENCOUNTER — APPOINTMENT (OUTPATIENT)
Dept: CARDIOLOGY | Facility: CLINIC | Age: 58
End: 2023-10-19
Payer: COMMERCIAL

## 2023-10-19 VITALS
HEIGHT: 69 IN | BODY MASS INDEX: 28.73 KG/M2 | DIASTOLIC BLOOD PRESSURE: 86 MMHG | WEIGHT: 194 LBS | HEART RATE: 74 BPM | OXYGEN SATURATION: 99 % | SYSTOLIC BLOOD PRESSURE: 175 MMHG

## 2023-10-19 PROCEDURE — 93000 ELECTROCARDIOGRAM COMPLETE: CPT

## 2023-10-19 PROCEDURE — 99214 OFFICE O/P EST MOD 30 MIN: CPT | Mod: 25

## 2023-10-22 PROBLEM — N28.1 RENAL CYST: Status: ACTIVE | Noted: 2023-10-22

## 2024-01-05 ENCOUNTER — RX RENEWAL (OUTPATIENT)
Age: 59
End: 2024-01-05

## 2024-01-11 ENCOUNTER — APPOINTMENT (OUTPATIENT)
Dept: UROLOGY | Facility: CLINIC | Age: 59
End: 2024-01-11

## 2024-01-17 LAB
APPEARANCE: CLEAR
BACTERIA UR CULT: NORMAL
BACTERIA: NEGATIVE /HPF
BILIRUBIN URINE: NEGATIVE
BLOOD URINE: NEGATIVE
CAST: 0 /LPF
COLOR: YELLOW
EPITHELIAL CELLS: 0 /HPF
GLUCOSE QUALITATIVE U: NEGATIVE MG/DL
KETONES URINE: NEGATIVE MG/DL
LEUKOCYTE ESTERASE URINE: NEGATIVE
MICROSCOPIC-UA: NORMAL
NITRITE URINE: NEGATIVE
PH URINE: 7
PROTEIN URINE: NEGATIVE MG/DL
PSA FREE FLD-MCNC: 15 %
PSA FREE SERPL-MCNC: 1.29 NG/ML
PSA SERPL-MCNC: 8.72 NG/ML
RED BLOOD CELLS URINE: 1 /HPF
SPECIFIC GRAVITY URINE: 1.01
UROBILINOGEN URINE: 0.2 MG/DL
WHITE BLOOD CELLS URINE: 0 /HPF

## 2024-01-24 ENCOUNTER — APPOINTMENT (OUTPATIENT)
Dept: CARDIOLOGY | Facility: CLINIC | Age: 59
End: 2024-01-24
Payer: COMMERCIAL

## 2024-01-24 VITALS
HEART RATE: 76 BPM | BODY MASS INDEX: 31.18 KG/M2 | SYSTOLIC BLOOD PRESSURE: 148 MMHG | HEIGHT: 66 IN | DIASTOLIC BLOOD PRESSURE: 80 MMHG | OXYGEN SATURATION: 99 % | WEIGHT: 194 LBS

## 2024-01-24 PROCEDURE — 93000 ELECTROCARDIOGRAM COMPLETE: CPT

## 2024-01-24 PROCEDURE — G2211 COMPLEX E/M VISIT ADD ON: CPT

## 2024-01-24 PROCEDURE — 99214 OFFICE O/P EST MOD 30 MIN: CPT

## 2024-01-24 NOTE — DISCUSSION/SUMMARY
[FreeTextEntry1] : 58 year man with a history as listed presents for a followup cardiac evaluation.  Rusty is doing better. HIs chest pain has resolved with better BP control. He did not tolerate Coreg. He will increase his Toprol 50mg q12. He will increase his  Entresto to 97-103mg q12. He will try to maintain a BP log at home. Reducing dietary salt intake advised.  He will continue with statin therapy to achieve maintain goal LDL<100 or ideally <70. His triglycerides have also been on the higher side of normal.  He will continue fish oil twice a day. He has MORIS on HST. He did not start on CPAP.  Exercise and diet counseling was performed in order to reduce her future cardiovascular risk. He will followup with me in 4-5  months or sooner if necessary.   [EKG obtained to assist in diagnosis and management of assessed problem(s)] : EKG obtained to assist in diagnosis and management of assessed problem(s)

## 2024-01-24 NOTE — REVIEW OF SYSTEMS
[Joint Pain] : no joint pain [Rash] : no rash [Itching] : itching [Dizziness] : no dizziness [Depression] : no depression [Anxiety] : no anxiety [Easy Bleeding] : no tendency for easy bleeding [Easy Bruising] : no tendency for easy bruising [Negative] : Genitourinary [de-identified] : See HPI

## 2024-01-24 NOTE — PHYSICAL EXAM
[Well Developed] : well developed [Well Nourished] : well nourished [No Acute Distress] : no acute distress [Normal Venous Pressure] : normal venous pressure [No Carotid Bruit] : no carotid bruit [Normal S1, S2] : normal S1, S2 [No Rub] : no rub [No Gallop] : no gallop [Rhythm Regular] : regular [Clear Lung Fields] : clear lung fields [Good Air Entry] : good air entry [No Respiratory Distress] : no respiratory distress  [Soft] : abdomen soft [Non Tender] : non-tender [Normal Bowel Sounds] : normal bowel sounds [No Masses/organomegaly] : no masses/organomegaly [Normal Gait] : normal gait [No Edema] : no edema [No Cyanosis] : no cyanosis [No Clubbing] : no clubbing [No Varicosities] : no varicosities [No Rash] : no rash [No Skin Lesions] : no skin lesions [No Focal Deficits] : no focal deficits [Moves all extremities] : moves all extremities [Normal Speech] : normal speech [Alert and Oriented] : alert and oriented [Normal memory] : normal memory [de-identified] : itching  [Normal Appearance] : normal appearance [General Appearance - Well Developed] : well developed [Well Groomed] : well groomed [General Appearance - Well Nourished] : well nourished [No Deformities] : no deformities [General Appearance - In No Acute Distress] : no acute distress [Normal Conjunctiva] : the conjunctiva exhibited no abnormalities [No Oral Pallor] : no oral pallor [Normal Oral Mucosa] : normal oral mucosa [No Oral Cyanosis] : no oral cyanosis [Normal Jugular Venous A Waves Present] : normal jugular venous A waves present [Normal Jugular Venous V Waves Present] : normal jugular venous V waves present [No Jugular Venous Hatch A Waves] : no jugular venous hatch A waves [Respiration, Rhythm And Depth] : normal respiratory rhythm and effort [Exaggerated Use Of Accessory Muscles For Inspiration] : no accessory muscle use [Auscultation Breath Sounds / Voice Sounds] : lungs were clear to auscultation bilaterally [Bowel Sounds] : normal bowel sounds [Abdomen Soft] : soft [Abdomen Tenderness] : non-tender [Abnormal Walk] : normal gait [Gait - Sufficient For Exercise Testing] : the gait was sufficient for exercise testing [Nail Clubbing] : no clubbing of the fingernails [Cyanosis, Localized] : no localized cyanosis [Skin Color & Pigmentation] : normal skin color and pigmentation [Skin Turgor] : normal skin turgor [] : no rash [Impaired Insight] : insight and judgment were intact [Oriented To Time, Place, And Person] : oriented to person, place, and time [No Anxiety] : not feeling anxious [Normal Rate] : normal [Normal S1] : normal S1 [Normal S2] : normal S2 [S3] : no S3 [S4] : no S4 [No Murmur] : no murmurs heard [2+] : left 2+ [Right Carotid Bruit] : no bruit heard over the right carotid [Left Carotid Bruit] : no bruit heard over the left carotid [Right Femoral Bruit] : no bruit heard over the right femoral artery [Left Femoral Bruit] : no bruit heard over the left femoral artery [No Abnormalities] : the abdominal aorta was not enlarged and no bruit was heard [1+] : left 1+ [No Pitting Edema] : no pitting edema present

## 2024-01-24 NOTE — HISTORY OF PRESENT ILLNESS
[FreeTextEntry1] : 58-year-old  male with history of HTN, HLD, CAD s/p PCI, DM present for a followup visit.    He presents today is doing well.  Since his last visit, he is feeling well overall. He no longer has any chest pain. He is walking about 30 minutes on his treadmill at 3.0mph with no incline daily with no issues.  He   denies any PND, orthopnea, lower extremity edema, near syncope, syncope, stroke like symptoms.  Medication reconciliation performed. He is compliant with his medications.

## 2024-01-24 NOTE — CARDIOLOGY SUMMARY
[de-identified] : SR  [de-identified] : Nuclear February 2021 10 METS 8/6/23 nuc: treadmill was switch to pharm for HTN. normal SPECT [de-identified] : April 2019 Mild hypocontractility of inferior lateral wall LVEF 60% 7/24/23 EF 62 [de-identified] : 4/2019  Cardiac catheterization demonstrated  99% lesion of OM 2 treated with drug-eluting stents. Had a 50% mid LAD and 50% mid circumflex lesion. He had a non-dominant right. [Normal] : normal [No Ischemia] : no Ischemia [___] : [unfilled] [LVEF ___%] : LVEF [unfilled]%

## 2024-01-25 ENCOUNTER — RX RENEWAL (OUTPATIENT)
Age: 59
End: 2024-01-25

## 2024-01-25 RX ORDER — CLOPIDOGREL BISULFATE 75 MG/1
75 TABLET, FILM COATED ORAL
Qty: 90 | Refills: 3 | Status: ACTIVE | COMMUNITY
Start: 2020-08-24 | End: 1900-01-01

## 2024-02-09 ENCOUNTER — NON-APPOINTMENT (OUTPATIENT)
Age: 59
End: 2024-02-09

## 2024-02-14 ENCOUNTER — EMERGENCY (EMERGENCY)
Facility: HOSPITAL | Age: 59
LOS: 1 days | Discharge: HOME | End: 2024-02-14
Attending: EMERGENCY MEDICINE | Admitting: EMERGENCY MEDICINE
Payer: COMMERCIAL

## 2024-02-14 VITALS
SYSTOLIC BLOOD PRESSURE: 173 MMHG | DIASTOLIC BLOOD PRESSURE: 99 MMHG | OXYGEN SATURATION: 100 % | TEMPERATURE: 99 F | RESPIRATION RATE: 16 BRPM | HEART RATE: 69 BPM | WEIGHT: 189.6 LBS | HEIGHT: 66 IN

## 2024-02-14 LAB
ALBUMIN SERPL ELPH-MCNC: 4.5 G/DL — SIGNIFICANT CHANGE UP (ref 3.3–5)
ALP SERPL-CCNC: 117 U/L — SIGNIFICANT CHANGE UP (ref 30–120)
ALT FLD-CCNC: 66 U/L — HIGH (ref 10–60)
ANION GAP SERPL CALC-SCNC: 7 MMOL/L — SIGNIFICANT CHANGE UP (ref 5–17)
APPEARANCE UR: CLEAR — SIGNIFICANT CHANGE UP
AST SERPL-CCNC: 32 U/L — SIGNIFICANT CHANGE UP (ref 10–40)
BASOPHILS # BLD AUTO: 0 K/UL — SIGNIFICANT CHANGE UP (ref 0–0.2)
BASOPHILS NFR BLD AUTO: 0 % — SIGNIFICANT CHANGE UP (ref 0–2)
BILIRUB SERPL-MCNC: 0.6 MG/DL — SIGNIFICANT CHANGE UP (ref 0.2–1.2)
BILIRUB UR-MCNC: NEGATIVE — SIGNIFICANT CHANGE UP
BUN SERPL-MCNC: 8 MG/DL — SIGNIFICANT CHANGE UP (ref 7–23)
CALCIUM SERPL-MCNC: 9.1 MG/DL — SIGNIFICANT CHANGE UP (ref 8.4–10.5)
CHLORIDE SERPL-SCNC: 102 MMOL/L — SIGNIFICANT CHANGE UP (ref 96–108)
CO2 SERPL-SCNC: 29 MMOL/L — SIGNIFICANT CHANGE UP (ref 22–31)
COLOR SPEC: YELLOW — SIGNIFICANT CHANGE UP
CREAT SERPL-MCNC: 1.01 MG/DL — SIGNIFICANT CHANGE UP (ref 0.5–1.3)
DIFF PNL FLD: NEGATIVE — SIGNIFICANT CHANGE UP
EGFR: 85 ML/MIN/1.73M2 — SIGNIFICANT CHANGE UP
EOSINOPHIL # BLD AUTO: 0.29 K/UL — SIGNIFICANT CHANGE UP (ref 0–0.5)
EOSINOPHIL NFR BLD AUTO: 2 % — SIGNIFICANT CHANGE UP (ref 0–6)
FLUAV AG NPH QL: SIGNIFICANT CHANGE UP
FLUBV AG NPH QL: SIGNIFICANT CHANGE UP
GLUCOSE SERPL-MCNC: 116 MG/DL — HIGH (ref 70–99)
GLUCOSE UR QL: NEGATIVE MG/DL — SIGNIFICANT CHANGE UP
HCT VFR BLD CALC: 42 % — SIGNIFICANT CHANGE UP (ref 39–50)
HGB BLD-MCNC: 14 G/DL — SIGNIFICANT CHANGE UP (ref 13–17)
KETONES UR-MCNC: NEGATIVE MG/DL — SIGNIFICANT CHANGE UP
LEUKOCYTE ESTERASE UR-ACNC: ABNORMAL
LYMPHOCYTES # BLD AUTO: 31 % — SIGNIFICANT CHANGE UP (ref 13–44)
LYMPHOCYTES # BLD AUTO: 4.56 K/UL — HIGH (ref 1–3.3)
MAGNESIUM SERPL-MCNC: 2.2 MG/DL — SIGNIFICANT CHANGE UP (ref 1.6–2.6)
MCHC RBC-ENTMCNC: 29 PG — SIGNIFICANT CHANGE UP (ref 27–34)
MCHC RBC-ENTMCNC: 33.3 GM/DL — SIGNIFICANT CHANGE UP (ref 32–36)
MCV RBC AUTO: 87.1 FL — SIGNIFICANT CHANGE UP (ref 80–100)
MONOCYTES # BLD AUTO: 1.32 K/UL — HIGH (ref 0–0.9)
MONOCYTES NFR BLD AUTO: 9 % — SIGNIFICANT CHANGE UP (ref 2–14)
NEUTROPHILS # BLD AUTO: 6.03 K/UL — SIGNIFICANT CHANGE UP (ref 1.8–7.4)
NEUTROPHILS NFR BLD AUTO: 41 % — LOW (ref 43–77)
NITRITE UR-MCNC: NEGATIVE — SIGNIFICANT CHANGE UP
NRBC # BLD: SIGNIFICANT CHANGE UP /100 WBCS (ref 0–0)
PH UR: 7 — SIGNIFICANT CHANGE UP (ref 5–8)
PLATELET # BLD AUTO: 298 K/UL — SIGNIFICANT CHANGE UP (ref 150–400)
POTASSIUM SERPL-MCNC: 3.6 MMOL/L — SIGNIFICANT CHANGE UP (ref 3.5–5.3)
POTASSIUM SERPL-SCNC: 3.6 MMOL/L — SIGNIFICANT CHANGE UP (ref 3.5–5.3)
PROT SERPL-MCNC: 7.7 G/DL — SIGNIFICANT CHANGE UP (ref 6–8.3)
PROT UR-MCNC: NEGATIVE MG/DL — SIGNIFICANT CHANGE UP
RAPID RVP RESULT: SIGNIFICANT CHANGE UP
RBC # BLD: 4.82 M/UL — SIGNIFICANT CHANGE UP (ref 4.2–5.8)
RBC # FLD: 12.8 % — SIGNIFICANT CHANGE UP (ref 10.3–14.5)
RSV RNA NPH QL NAA+NON-PROBE: SIGNIFICANT CHANGE UP
SARS-COV-2 RNA SPEC QL NAA+PROBE: SIGNIFICANT CHANGE UP
SARS-COV-2 RNA SPEC QL NAA+PROBE: SIGNIFICANT CHANGE UP
SODIUM SERPL-SCNC: 138 MMOL/L — SIGNIFICANT CHANGE UP (ref 135–145)
SP GR SPEC: 1.01 — SIGNIFICANT CHANGE UP (ref 1–1.03)
TROPONIN I, HIGH SENSITIVITY RESULT: 5.7 NG/L — SIGNIFICANT CHANGE UP
TROPONIN I, HIGH SENSITIVITY RESULT: 6.6 NG/L — SIGNIFICANT CHANGE UP
UROBILINOGEN FLD QL: 0.2 MG/DL — SIGNIFICANT CHANGE UP (ref 0.2–1)
WBC # BLD: 14.7 K/UL — HIGH (ref 3.8–10.5)
WBC # FLD AUTO: 14.7 K/UL — HIGH (ref 3.8–10.5)

## 2024-02-14 PROCEDURE — 71045 X-RAY EXAM CHEST 1 VIEW: CPT

## 2024-02-14 PROCEDURE — 93010 ELECTROCARDIOGRAM REPORT: CPT | Mod: 76

## 2024-02-14 PROCEDURE — 93005 ELECTROCARDIOGRAM TRACING: CPT

## 2024-02-14 PROCEDURE — 99285 EMERGENCY DEPT VISIT HI MDM: CPT

## 2024-02-14 PROCEDURE — 96375 TX/PRO/DX INJ NEW DRUG ADDON: CPT

## 2024-02-14 PROCEDURE — 96374 THER/PROPH/DIAG INJ IV PUSH: CPT

## 2024-02-14 PROCEDURE — 84484 ASSAY OF TROPONIN QUANT: CPT

## 2024-02-14 PROCEDURE — 71045 X-RAY EXAM CHEST 1 VIEW: CPT | Mod: 26

## 2024-02-14 PROCEDURE — 80053 COMPREHEN METABOLIC PANEL: CPT

## 2024-02-14 PROCEDURE — 0225U NFCT DS DNA&RNA 21 SARSCOV2: CPT

## 2024-02-14 PROCEDURE — 83735 ASSAY OF MAGNESIUM: CPT

## 2024-02-14 PROCEDURE — 81001 URINALYSIS AUTO W/SCOPE: CPT

## 2024-02-14 PROCEDURE — 87637 SARSCOV2&INF A&B&RSV AMP PRB: CPT

## 2024-02-14 PROCEDURE — 36415 COLL VENOUS BLD VENIPUNCTURE: CPT

## 2024-02-14 PROCEDURE — 99285 EMERGENCY DEPT VISIT HI MDM: CPT | Mod: 25

## 2024-02-14 PROCEDURE — 85025 COMPLETE CBC W/AUTO DIFF WBC: CPT

## 2024-02-14 RX ORDER — FAMOTIDINE 10 MG/ML
20 INJECTION INTRAVENOUS ONCE
Refills: 0 | Status: COMPLETED | OUTPATIENT
Start: 2024-02-14 | End: 2024-02-14

## 2024-02-14 RX ORDER — SODIUM CHLORIDE 9 MG/ML
1000 INJECTION INTRAMUSCULAR; INTRAVENOUS; SUBCUTANEOUS ONCE
Refills: 0 | Status: COMPLETED | OUTPATIENT
Start: 2024-02-14 | End: 2024-02-14

## 2024-02-14 RX ORDER — DIPHENHYDRAMINE HCL 50 MG
25 CAPSULE ORAL ONCE
Refills: 0 | Status: COMPLETED | OUTPATIENT
Start: 2024-02-14 | End: 2024-02-14

## 2024-02-14 RX ADMIN — SODIUM CHLORIDE 1000 MILLILITER(S): 9 INJECTION INTRAMUSCULAR; INTRAVENOUS; SUBCUTANEOUS at 17:19

## 2024-02-14 RX ADMIN — Medication 25 MILLIGRAM(S): at 18:13

## 2024-02-14 RX ADMIN — SODIUM CHLORIDE 1000 MILLILITER(S): 9 INJECTION INTRAMUSCULAR; INTRAVENOUS; SUBCUTANEOUS at 18:12

## 2024-02-14 RX ADMIN — FAMOTIDINE 20 MILLIGRAM(S): 10 INJECTION INTRAVENOUS at 18:12

## 2024-02-14 NOTE — ED PROVIDER NOTE - CARE PROVIDER_API CALL
Foster Salcedo  Internal Medicine  [address] [place] [address]  Phone: [phone]  Fax: [phone]  Follow Up Time: 1-3 Days

## 2024-02-14 NOTE — ED PROVIDER NOTE - NSFOLLOWUPINSTRUCTIONS_ED_ALL_ED_FT
Follow-up with your PCP for reevaluation, ongoing care and treatment.  Rest, stay hydrated.  If having worsening of symptoms, fever, abdominal pain, shortness of breath, chest pain or other related symptoms, return to the ER immediately.

## 2024-02-14 NOTE — ED PROVIDER NOTE - DIFFERENTIAL DIAGNOSIS
Differential Diagnosis Patient presenting to the emergency room with vague complaints.  Differential includes but not limited to possible viral infection versus cardiac pathology versus stress related reaction.  Will obtain screening labs check UA urine culture obtain viral swab chest x-ray to exclude infectious etiology.  Will obtain EKG cardiac enzymes hydrate and monitor.

## 2024-02-14 NOTE — ED PROVIDER NOTE - PROGRESS NOTE DETAILS
Reevaluated patient at bedside.  Discussed the results of all diagnostic testing in ED and copies of all reports given. Advised to f/u with pcp, strict return precautions given.  An opportunity to ask questions was given.  Discussed the importance of prompt, close medical follow-up.  Patient will return with any changes, concerns or persistent / worsening symptoms.  Understanding of all instructions verbalized.

## 2024-02-14 NOTE — ED ADULT NURSE REASSESSMENT NOTE - NS ED NURSE REASSESS COMMENT FT1
19:10-Assumed care of pt from BRIANA Choi, found laying in bed, denies pain at this time, awaiting repeat lab results/disposition. RAMIRO Payton

## 2024-02-14 NOTE — ED PROVIDER NOTE - OBJECTIVE STATEMENT
58-year-old male with history of CAD with stents on Plavix, hypertension, hyperlipidemia, BPH, diabetes presents with complaint of generalized weakness, decreased appetite, occasional nausea x 10 days.  Patient states that he saw his PCP, Dr. Ralph Salcedo 5 days ago and had blood work, was found to have elevated hemoglobin A1c and was started on metformin, has chronic elevated white blood cell count and sees oncologist, Dr. Cadet.  Patient states that he has had recent financial stress.  States that his symptoms do not occur every day and is intermittent.  Denies fever, vomiting, diarrhea, abdominal pain, chest pain, shortness of breath, cough, body aches, rash, dysuria, hematuria, recent travel, sick contacts, SI/HI or other symptoms. 58-year-old male with history of CAD with stents on Plavix, hypertension, hyperlipidemia, BPH, diabetes presents with complaint of generalized weakness, decreased appetite, occasional nausea x 10 days.  Patient states that he saw his PCP, Dr. Foster Salcedo 5 days ago and had blood work, was found to have elevated hemoglobin A1c and was started on metformin, has chronic elevated white blood cell count and sees oncologist, Dr. Cadet.  Patient states that he has had recent financial stress.  States that his symptoms do not occur every day and is intermittent.  Denies fever, vomiting, diarrhea, abdominal pain, chest pain, shortness of breath, cough, body aches, rash, dysuria, hematuria, recent travel, sick contacts, SI/HI or other symptoms.

## 2024-02-14 NOTE — ED ADULT NURSE NOTE - CHIEF COMPLAINT QUOTE
" for the last 10 days , I feel like I don't want to eat , nauseous - with very low energy " Pt reported recently in stress due to finances.  Pt denies any fever , cough , chest pain or chills

## 2024-02-14 NOTE — ED ADULT TRIAGE NOTE - CHIEF COMPLAINT QUOTE
" for the last 10 days , I feel like I don't want to eat , nauseous - with very low energy " Pt denies any fever , cough , chest pain or chills " for the last 10 days , I feel like I don't want to eat , nauseous - with very low energy " Pt reported recently in stress due to finances.  Pt denies any fever , cough , chest pain or chills

## 2024-02-14 NOTE — ED PROVIDER NOTE - WHICH SHOWED
Independent review of chest x-ray reveals no acute infiltrate.  Independent review of EKG reveals sinus rhythm at 69 bpm

## 2024-02-14 NOTE — ED PROVIDER NOTE - CLINICAL SUMMARY MEDICAL DECISION MAKING FREE TEXT BOX
Patient is a 58-year-old male presents to the emergency room with vague multiple complaints.  Past medical history of CAD with stents on Plavix hypertension hyperlipidemia BPH diabetes.  Reports that been experiencing generalized weakness decreased p.o. intake occasional nausea for the last 10 days.  Did follow-up with PMD had outpatient labs and found to have an elevated hemoglobin A1c was started on metformin.  Also endorses that he is a chronic elevated white count follows with oncology for this.  Further notes that he has been under a lot of financial stress recently.  Symptoms or not every day seem to be intermittent may be related more to stress.  Denies any fevers vomiting diarrhea chest pain shortness of breath or abdominal pain at this time.  No skin rashes.  Denies any suicidal or homicidal ideation.  On exam patient is lying in bed no acute distress normocephalic atraumatic pupils equal round and reactive dry mucous membranes heart regular rate lungs clear to auscultation abdomen soft nontender nondistended.  Patient presenting to the emergency room with vague complaints.  Differential includes but not limited to possible viral infection versus cardiac pathology versus stress related reaction.  Will obtain screening labs check UA urine culture obtain viral swab chest x-ray to exclude infectious etiology.  Will obtain EKG cardiac enzymes hydrate and monitor.  Results of labs reviewed patient with a white count of 14 reports that he has chronic leukocytosis.  Is a predominant lymphocyte count recent suspicion for possible viral etiology.  Advised to continue to follow-up with oncology.  CMP noted.  Initial troponin negative.  Urine does not appear to be infected.  Viral swab negative.  Independent review of chest x-ray reveals no acute infiltrate.  Independent review of EKG reveals sinus rhythm at 69 bpm.  Patient signed out to oncoming attending pending repeat troponin reevaluation.

## 2024-02-14 NOTE — ED PROVIDER NOTE - PATIENT PORTAL LINK FT
You can access the FollowMyHealth Patient Portal offered by [facility] by registering at the following website: [email or web address]. By joining [employer]’s FollowMyHealth portal, you will also be able to view your health information using other applications (apps) compatible with our system.

## 2024-03-17 ENCOUNTER — EMERGENCY (EMERGENCY)
Facility: HOSPITAL | Age: 59
LOS: 1 days | Discharge: HOME | End: 2024-03-17
Attending: EMERGENCY MEDICINE | Admitting: EMERGENCY MEDICINE
Payer: COMMERCIAL

## 2024-03-17 VITALS
DIASTOLIC BLOOD PRESSURE: 82 MMHG | HEART RATE: 79 BPM | SYSTOLIC BLOOD PRESSURE: 178 MMHG | RESPIRATION RATE: 14 BRPM | HEIGHT: 66 IN | WEIGHT: 177.91 LBS | TEMPERATURE: 98 F | OXYGEN SATURATION: 98 %

## 2024-03-17 VITALS
DIASTOLIC BLOOD PRESSURE: 79 MMHG | HEART RATE: 68 BPM | OXYGEN SATURATION: 97 % | RESPIRATION RATE: 16 BRPM | TEMPERATURE: 98 F | SYSTOLIC BLOOD PRESSURE: 153 MMHG

## 2024-03-17 LAB
ALBUMIN SERPL ELPH-MCNC: 4.2 G/DL — SIGNIFICANT CHANGE UP (ref 3.3–5)
ALP SERPL-CCNC: 119 U/L — SIGNIFICANT CHANGE UP (ref 30–120)
ALT FLD-CCNC: 57 U/L — SIGNIFICANT CHANGE UP (ref 10–60)
ANION GAP SERPL CALC-SCNC: 8 MMOL/L — SIGNIFICANT CHANGE UP (ref 5–17)
APPEARANCE UR: CLEAR — SIGNIFICANT CHANGE UP
APTT BLD: 32.3 SEC — SIGNIFICANT CHANGE UP (ref 24.5–35.6)
AST SERPL-CCNC: 33 U/L — SIGNIFICANT CHANGE UP (ref 10–40)
BACTERIA # UR AUTO: NEGATIVE /HPF — SIGNIFICANT CHANGE UP
BASOPHILS # BLD AUTO: 0.16 K/UL — SIGNIFICANT CHANGE UP (ref 0–0.2)
BASOPHILS NFR BLD AUTO: 1 % — SIGNIFICANT CHANGE UP (ref 0–2)
BILIRUB SERPL-MCNC: 0.6 MG/DL — SIGNIFICANT CHANGE UP (ref 0.2–1.2)
BILIRUB UR-MCNC: NEGATIVE — SIGNIFICANT CHANGE UP
BUN SERPL-MCNC: 10 MG/DL — SIGNIFICANT CHANGE UP (ref 7–23)
CALCIUM SERPL-MCNC: 9.2 MG/DL — SIGNIFICANT CHANGE UP (ref 8.4–10.5)
CHLORIDE SERPL-SCNC: 101 MMOL/L — SIGNIFICANT CHANGE UP (ref 96–108)
CO2 SERPL-SCNC: 27 MMOL/L — SIGNIFICANT CHANGE UP (ref 22–31)
COLOR SPEC: YELLOW — SIGNIFICANT CHANGE UP
CREAT SERPL-MCNC: 0.84 MG/DL — SIGNIFICANT CHANGE UP (ref 0.5–1.3)
DIFF PNL FLD: NEGATIVE — SIGNIFICANT CHANGE UP
EGFR: 100 ML/MIN/1.73M2 — SIGNIFICANT CHANGE UP
EOSINOPHIL # BLD AUTO: 0.82 K/UL — HIGH (ref 0–0.5)
EOSINOPHIL NFR BLD AUTO: 5 % — SIGNIFICANT CHANGE UP (ref 0–6)
EPI CELLS # UR: SIGNIFICANT CHANGE UP
GLUCOSE SERPL-MCNC: 114 MG/DL — HIGH (ref 70–99)
GLUCOSE UR QL: NEGATIVE MG/DL — SIGNIFICANT CHANGE UP
HCT VFR BLD CALC: 42.5 % — SIGNIFICANT CHANGE UP (ref 39–50)
HGB BLD-MCNC: 14.1 G/DL — SIGNIFICANT CHANGE UP (ref 13–17)
INR BLD: 1.16 RATIO — SIGNIFICANT CHANGE UP (ref 0.85–1.18)
KETONES UR-MCNC: NEGATIVE MG/DL — SIGNIFICANT CHANGE UP
LEUKOCYTE ESTERASE UR-ACNC: ABNORMAL
LIDOCAIN IGE QN: 41 U/L — SIGNIFICANT CHANGE UP (ref 16–77)
LYMPHOCYTES # BLD AUTO: 32 % — SIGNIFICANT CHANGE UP (ref 13–44)
LYMPHOCYTES # BLD AUTO: 5.25 K/UL — HIGH (ref 1–3.3)
MANUAL SMEAR VERIFICATION: SIGNIFICANT CHANGE UP
MCHC RBC-ENTMCNC: 28.8 PG — SIGNIFICANT CHANGE UP (ref 27–34)
MCHC RBC-ENTMCNC: 33.2 GM/DL — SIGNIFICANT CHANGE UP (ref 32–36)
MCV RBC AUTO: 86.7 FL — SIGNIFICANT CHANGE UP (ref 80–100)
MONOCYTES # BLD AUTO: 1.48 K/UL — HIGH (ref 0–0.9)
MONOCYTES NFR BLD AUTO: 9 % — SIGNIFICANT CHANGE UP (ref 2–14)
NEUTROPHILS # BLD AUTO: 6.24 K/UL — SIGNIFICANT CHANGE UP (ref 1.8–7.4)
NEUTROPHILS NFR BLD AUTO: 38 % — LOW (ref 43–77)
NITRITE UR-MCNC: NEGATIVE — SIGNIFICANT CHANGE UP
NRBC # BLD: 0 /100 WBCS — SIGNIFICANT CHANGE UP (ref 0–0)
NRBC # BLD: SIGNIFICANT CHANGE UP /100 WBCS (ref 0–0)
PH UR: 7 — SIGNIFICANT CHANGE UP (ref 5–8)
PLAT MORPH BLD: NORMAL — SIGNIFICANT CHANGE UP
PLATELET # BLD AUTO: 275 K/UL — SIGNIFICANT CHANGE UP (ref 150–400)
POTASSIUM SERPL-MCNC: 4.2 MMOL/L — SIGNIFICANT CHANGE UP (ref 3.5–5.3)
POTASSIUM SERPL-SCNC: 4.2 MMOL/L — SIGNIFICANT CHANGE UP (ref 3.5–5.3)
PROT SERPL-MCNC: 7.6 G/DL — SIGNIFICANT CHANGE UP (ref 6–8.3)
PROT UR-MCNC: NEGATIVE MG/DL — SIGNIFICANT CHANGE UP
PROTHROM AB SERPL-ACNC: 12.6 SEC — SIGNIFICANT CHANGE UP (ref 9.5–13)
RBC # BLD: 4.9 M/UL — SIGNIFICANT CHANGE UP (ref 4.2–5.8)
RBC # FLD: 12.6 % — SIGNIFICANT CHANGE UP (ref 10.3–14.5)
RBC BLD AUTO: NORMAL — SIGNIFICANT CHANGE UP
RBC CASTS # UR COMP ASSIST: 0 /HPF — SIGNIFICANT CHANGE UP (ref 0–4)
SODIUM SERPL-SCNC: 136 MMOL/L — SIGNIFICANT CHANGE UP (ref 135–145)
SP GR SPEC: 1.01 — SIGNIFICANT CHANGE UP (ref 1–1.03)
TROPONIN I, HIGH SENSITIVITY RESULT: 6 NG/L — SIGNIFICANT CHANGE UP
UROBILINOGEN FLD QL: 0.2 MG/DL — SIGNIFICANT CHANGE UP (ref 0.2–1)
VARIANT LYMPHS # BLD: 15 % — HIGH (ref 0–6)
WBC # BLD: 16.41 K/UL — HIGH (ref 3.8–10.5)
WBC # FLD AUTO: 16.41 K/UL — HIGH (ref 3.8–10.5)
WBC UR QL: 0 /HPF — SIGNIFICANT CHANGE UP (ref 0–5)

## 2024-03-17 PROCEDURE — 85025 COMPLETE CBC W/AUTO DIFF WBC: CPT

## 2024-03-17 PROCEDURE — 76705 ECHO EXAM OF ABDOMEN: CPT | Mod: 26

## 2024-03-17 PROCEDURE — 93010 ELECTROCARDIOGRAM REPORT: CPT

## 2024-03-17 PROCEDURE — 83690 ASSAY OF LIPASE: CPT

## 2024-03-17 PROCEDURE — 99284 EMERGENCY DEPT VISIT MOD MDM: CPT

## 2024-03-17 PROCEDURE — 85610 PROTHROMBIN TIME: CPT

## 2024-03-17 PROCEDURE — 36415 COLL VENOUS BLD VENIPUNCTURE: CPT

## 2024-03-17 PROCEDURE — 74177 CT ABD & PELVIS W/CONTRAST: CPT | Mod: MC

## 2024-03-17 PROCEDURE — 71045 X-RAY EXAM CHEST 1 VIEW: CPT

## 2024-03-17 PROCEDURE — 76705 ECHO EXAM OF ABDOMEN: CPT

## 2024-03-17 PROCEDURE — 81001 URINALYSIS AUTO W/SCOPE: CPT

## 2024-03-17 PROCEDURE — 99285 EMERGENCY DEPT VISIT HI MDM: CPT | Mod: 25

## 2024-03-17 PROCEDURE — 93005 ELECTROCARDIOGRAM TRACING: CPT

## 2024-03-17 PROCEDURE — 74177 CT ABD & PELVIS W/CONTRAST: CPT | Mod: 26,MC

## 2024-03-17 PROCEDURE — 96360 HYDRATION IV INFUSION INIT: CPT | Mod: XU

## 2024-03-17 PROCEDURE — 80053 COMPREHEN METABOLIC PANEL: CPT

## 2024-03-17 PROCEDURE — 71045 X-RAY EXAM CHEST 1 VIEW: CPT | Mod: 26

## 2024-03-17 PROCEDURE — 84484 ASSAY OF TROPONIN QUANT: CPT

## 2024-03-17 PROCEDURE — 85730 THROMBOPLASTIN TIME PARTIAL: CPT

## 2024-03-17 RX ORDER — ONDANSETRON 8 MG/1
4 TABLET, FILM COATED ORAL ONCE
Refills: 0 | Status: COMPLETED | OUTPATIENT
Start: 2024-03-17 | End: 2024-03-17

## 2024-03-17 RX ORDER — SODIUM CHLORIDE 9 MG/ML
1000 INJECTION INTRAMUSCULAR; INTRAVENOUS; SUBCUTANEOUS ONCE
Refills: 0 | Status: COMPLETED | OUTPATIENT
Start: 2024-03-17 | End: 2024-03-17

## 2024-03-17 RX ADMIN — SODIUM CHLORIDE 1000 MILLILITER(S): 9 INJECTION INTRAMUSCULAR; INTRAVENOUS; SUBCUTANEOUS at 18:02

## 2024-03-17 RX ADMIN — SODIUM CHLORIDE 1000 MILLILITER(S): 9 INJECTION INTRAMUSCULAR; INTRAVENOUS; SUBCUTANEOUS at 19:02

## 2024-03-17 NOTE — ED PROVIDER NOTE - CLINICAL SUMMARY MEDICAL DECISION MAKING FREE TEXT BOX
58-year-old male with history of hypertension anxiety recently seen in ER for nausea and insomnia had negative ER workup.  Was seen by his PCP Dr. Arias who prescribed trazodone for sleep which patient states he started 2 days ago.  Complaining of continued nausea insomnia poor appetite and anxiety feeling.  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  Denies chest pain fever cough shortness of breath vomiting diarrhea dysuria hematuria or other symptom.    Physical exam is unrevealing.  I personally reviewed patient's ED evaluation from last visit and everything was normal including troponins.  Impression is nausea and insomnia possibly anxiety related.  Plan is we will repeat labs EKG and consider CT abdomen.  If ER workup negative will need outpatient follow-up.  May need outpatient psych therapy evaluation.

## 2024-03-17 NOTE — ED PROVIDER NOTE - OBJECTIVE STATEMENT
58-year-old male with history of hypertension anxiety recently seen in ER for nausea and insomnia had negative ER workup.  Was seen by his PCP Dr. Arias who prescribed trazodone for sleep which patient states he started 2 days ago.  Complaining of continued nausea insomnia poor appetite and anxiety feeling.  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  Denies chest pain fever cough shortness of breath vomiting diarrhea dysuria hematuria or other symptom.

## 2024-03-17 NOTE — ED ADULT NURSE NOTE - HIV OFFER
Caller: Bryan Roman    Relationship: Self    Best call back number: [phone]    What is the medical concern/diagnosis: PATIENT HAD TUBES IN HIS BACK FROM HIS HOSPITAL STAY AND STATES HE IS HAVING A LOT OF BACK PAIN.     What specialty or service is being requested: PHYSICAL THERAPY     Any additional details: PATIENT WOULD LIKE A CALL BACK ASAP PLEASE     
Called patient and informed referral entered for PT.  [facility] is where patient wishes to do PT, in [place], [place].  
Previously Declined (within the last year)

## 2024-03-17 NOTE — ED PROVIDER NOTE - PATIENT PORTAL LINK FT
You can access the FollowMyHealth Patient Portal offered by [facility] by registering at the following website: [email or web address]. By joining [employer]’s FollowMyHealth portal, you will also be able to view your health information using other applications (apps) compatible with our system.

## 2024-03-17 NOTE — ED PROVIDER NOTE - PROGRESS NOTE DETAILS
discussed results, copy provided, had RN place castro as pt was in retention, no sign uti from ua, has enlarged prostate, has a  for follow up; pt saw a heme/onc doctor in the last couple of weeks about the elevated wbc, they are following; will refer to gi for persistent nausea and decreased appetite

## 2024-03-17 NOTE — ED ADULT NURSE NOTE - FINAL NURSING ELECTRONIC SIGNATURE
"Get new glasses and wear them FULL TIME (100% of awake time).    John should get durable frames (ideally made of hard or flexible plastic) with large optics (no small, narrow lenses: your child will look over or under rather than through them) so that the eyes look through the glass at all times.  Some children require glasses with nose pieces for the best fit on their nasal bridge and ears.      The glasses should have a strap or custom-molded ear-bows or \"stay-puts\" to keep them securely in place.    [facility]  (Please verify eyewear coverage with your insurance provider prior to visit)        [facility] patients will receive a minimum 20% discount at our optical shops.    [facility]  [address] [place] [address]  [phone]    [facility]  [address] [place] [address]  [phone]    [facility]  [address] [place] [address]  [phone]    [facility]  [address] [place] [address]  [phone]      [facility] [place] [place] [facility]    [address] [place]  [address]    [phone]    [facility]    [address] [place] [address]    [phone]    [facility]  [address] [place], [place] [id number] [phone]    [facility]  [address], [address]  [place], [place] [id number] [phone]  (Yakut and Malawian interpreters on request)    [facility]   [facility]   [facility]   [address]   [place] [id number] [phone]     [facility]   [address]   [place] [address]   Phone: [phone]     [facility]   [place]  [place] [facility] Bld[facility]   [address]   [place] [address]   Phone: [phone]     [facility] "   [address]   [place] [address]   [phone]     [facility] (2 locations)   [address]   [place], [place] [address] [phone]   and   [address]   [place], [place] [address] [phone]     [facility]   [place] [facility] (3):   [facility]   [address] [place] [address]   [phone]   and   [address]   [address]   [place] [address]   [phone]   and   [address] [place] [address]   [phone]     [facility]   [address] [place] [address]   [phone]     [facility]   [address] [place] [address]   [phone]   (ong  available on request)     [facility]   [address] [place] [address]   [phone]     [facility]  [facility]  [address]  [place], [place] [address] [phone]    [facility]  [facility] Bld[facility]  [address]  [place], [place] [address] [phone]    [facility]  [address]  [place] [address] [phone]     [facility]  [facility] Bldg  [address]  [place] [address] [phone]    [facility]  [address] [place]  [place] [address] [phone]    [facility]  [facility]  [address]  [place] [address]  [phone]     [facility]  [address] [place] [address]  [phone]    [facility]  [address]  [place] [address]  [phone]    [facility]   [address]   [place] [address]   Phone: [phone]     [address]   [place] [address] [phone]          Here are also  "options for online glasses for kids (check if shipping is delayed when comparing):     [facility]  www.valuescopeniAlice.comal.[employer]/  Includes toddler sizes up, including options with straps.     Gurjit Giraldo  https://www.jesseniaBuscoTurno/kids  For kids about 4-8 years of age  Has at home trial pairs available     Mane Pollack  [email or web address] Health [email or web address]/  For kids 4+ years of age  Has at home trial pairs available     [employer]  Www.eyebuQire.[employer]     Glasses [employer]  www.TheVegibox.com.[employer]  Includes some toddler options and up     You can search for stores that carry popular frames such as:  [employer] Glasses  Airam Glasses  Troy Tabor       The frame brand \"Specs for Us\" was created for children with a flat nasal bridge: https://www.opymz1mv.com/         " 17-Mar-2024 20:58

## 2024-03-17 NOTE — ED PROVIDER NOTE - CARE PROVIDER_API CALL
Jaime Chambers  Gastroenterology  [address] [place] [address]  Phone: [phone]  Fax: [phone]  Follow Up Time: 1-3 Days    Call your primary care provider in the morning to discuss follow up,   Phone: (   )    -  Fax: (   )    -  Follow Up Time:     Call your urologist tomorrow morning for follow within 2-3 days,   Phone: (   )    -  Fax: (   )    -  Follow Up Time:     Call your hematologist to discuss elevation of your white blood cell count,   Phone: (   )    -  Fax: (   )    -  Follow Up Time:

## 2024-03-17 NOTE — ED ADULT NURSE NOTE - OBJECTIVE STATEMENT
patient A&Ox3 in no acute distress c/o of nausea before eating for 2 weeks , denied abdominal  pain no chest pain no difficulty [id number] no blood in stool or urine , no diarrhea no constipation, no headache moving all extremities no facial droop clear speech at this time

## 2024-03-17 NOTE — ED PROVIDER NOTE - ENMT NEGATIVE STATEMENT, MLM
Patient given a meal Ears: no ear pain and no hearing problems. Nose: no nasal congestion and no nasal drainage. Mouth/Throat: no dysphagia, no hoarseness and no throat pain. Neck: no lumps, no pain, no stiffness and no swollen glands.

## 2024-03-17 NOTE — ED PROVIDER NOTE - PROVIDER TOKENS
PROVIDER:[TOKEN:[[id number]],FOLLOWUP:[1-3 Days]],FREE:[LAST:[Call your primary care provider in the morning to discuss follow up],PHONE:[(   )    -],FAX:[(   )    -]],FREE:[LAST:[Call your urologist tomorrow morning for follow within 2-3 days],PHONE:[(   )    -],FAX:[(   )    -]],FREE:[LAST:[Call your hematologist to discuss elevation of your white blood cell count],PHONE:[(   )    -],FAX:[(   )    -]]

## 2024-03-18 ENCOUNTER — EMERGENCY (EMERGENCY)
Facility: HOSPITAL | Age: 59
LOS: 1 days | Discharge: HOME | End: 2024-03-18
Attending: EMERGENCY MEDICINE | Admitting: EMERGENCY MEDICINE
Payer: COMMERCIAL

## 2024-03-18 ENCOUNTER — NON-APPOINTMENT (OUTPATIENT)
Age: 59
End: 2024-03-18

## 2024-03-18 VITALS
WEIGHT: 153.88 LBS | HEART RATE: 80 BPM | OXYGEN SATURATION: 98 % | DIASTOLIC BLOOD PRESSURE: 93 MMHG | RESPIRATION RATE: 18 BRPM | HEIGHT: 66 IN | SYSTOLIC BLOOD PRESSURE: 170 MMHG | TEMPERATURE: 99 F

## 2024-03-18 VITALS
HEIGHT: 66 IN | DIASTOLIC BLOOD PRESSURE: 91 MMHG | TEMPERATURE: 98 F | OXYGEN SATURATION: 98 % | HEART RATE: 71 BPM | SYSTOLIC BLOOD PRESSURE: 184 MMHG | WEIGHT: 179.02 LBS | RESPIRATION RATE: 16 BRPM

## 2024-03-18 VITALS
HEART RATE: 77 BPM | SYSTOLIC BLOOD PRESSURE: 142 MMHG | TEMPERATURE: 98 F | OXYGEN SATURATION: 98 % | RESPIRATION RATE: 17 BRPM | DIASTOLIC BLOOD PRESSURE: 85 MMHG

## 2024-03-18 PROCEDURE — 99283 EMERGENCY DEPT VISIT LOW MDM: CPT | Mod: 25

## 2024-03-18 PROCEDURE — 51702 INSERT TEMP BLADDER CATH: CPT

## 2024-03-18 PROCEDURE — 99283 EMERGENCY DEPT VISIT LOW MDM: CPT

## 2024-03-18 PROCEDURE — 99282 EMERGENCY DEPT VISIT SF MDM: CPT

## 2024-03-18 NOTE — ED ADULT TRIAGE NOTE - TEMPERATURE IN CELSIUS (DEGREES C)
Pt seen for SEVERE MALNUTRITION FOLLOW UP     Medical Course: 68 y/o male HTN, DM2, hypothyroidism, CAD s/p CABG, TANG cirrhosis, follicular lymphoma (on Rituximab OP), chronic Hep B on tenofovir (HBV Core +), presented to the ED w/ chest pain and constipation while in ED developed hematemesis s/p MICU admission for acute blood loss anemia c/b hypovolemic shock  intubated for airway protection (extubated 3/8) s/p 2/24 bedside EGD with banding esophageal varices now with persistent encephalopathy.       Nutrition Course: Unable to conduct nutrition interview 2/2 decreased cognition. Information obtained from comprehensive chart review and per RN today:  No recent episodes of nausea, vomiting, diarrhea or constipation reported, BM noted on 3/13 per RN flowsheets. Pt remains NPO TF only as sole source of nutrition and hydration- glucerna 1.2 @ 40ml/hr x24hrs; 960ml total volume, 1152 kcal, 58 gm protein, 778ml free water from feeding. Current TF regimen does not meet pt's estimated needs. Recommend change TF to glucerna 1.5 @ 45ml/hr x24hrs- 1080ml total volume, 1620 kcal (28 kcal/kg), 89 gm protein (1.5 gm/kg), 821ml free water from formula.     Diet Prescription: Diet, NPO with Tube Feed: 57.3  Tube Feeding Modality: Orogastric  Glucerna 1.2 Nelson (GLUCERNARTH)  Total Volume for 24 Hours (mL): 960  Continuous  Starting Tube Feed Rate {mL per Hour}: 20  Increase Tube Feed Rate by (mL): 5     Every 6 hours  Until Goal Tube Feed Rate (mL per Hour): 40  Tube Feed Duration (in Hours): 24  Tube Feed Start Time: 12:00 (03-13-23 @ 13:52)    Pertinent Medications: MEDICATIONS  (STANDING):  chlorhexidine 2% Cloths 1 Application(s) Topical <User Schedule>  ciprofloxacin     Tablet 500 milliGRAM(s) Oral every 24 hours  coronavirus bivalent (EUA) Booster Vaccine (PFIZER) 0.3 milliLiter(s) IntraMuscular once  dextrose 50% Injectable 25 Gram(s) IV Push once  dextrose 50% Injectable 12.5 Gram(s) IV Push once  dextrose 50% Injectable 25 Gram(s) IV Push once  furosemide    Tablet 20 milliGRAM(s) Oral daily  insulin lispro (ADMELOG) corrective regimen sliding scale   SubCutaneous every 6 hours  insulin NPH human recombinant 10 Unit(s) SubCutaneous every 6 hours  lactulose Syrup 10 Gram(s) Oral three times a day  levothyroxine Injectable 75 MICROGram(s) IV Push at bedtime  pantoprazole  Injectable 40 milliGRAM(s) IV Push daily  rifAXIMin 550 milliGRAM(s) Oral two times a day  spironolactone 50 milliGRAM(s) Oral daily  tenofovir disoproxil fumarate (VIREAD) 300 milliGRAM(s) Oral daily    MEDICATIONS  (PRN):  dextrose Oral Gel 15 Gram(s) Oral once PRN Blood Glucose LESS THAN 70 milliGRAM(s)/deciliter  LORazepam   Injectable 1.5 milliGRAM(s) IV Push once PRN pre-MRI    Pertinent Labs: 03-14 Na147 mmol/L<H> Glu 161 mg/dL<H> K+ 3.7 mmol/L Cr  1.18 mg/dL BUN 55 mg/dL<H> 03-14 Phos 3.4 mg/dL 03-14 Alb 2.6 g/dL<L>    POCT Blood Glucose.: 161 mg/dL (14 Mar 2023 11:38)  POCT Blood Glucose.: 139 mg/dL (14 Mar 2023 06:03)  POCT Blood Glucose.: 128 mg/dL (14 Mar 2023 00:06)  POCT Blood Glucose.: 158 mg/dL (13 Mar 2023 18:18)  POCT Blood Glucose.: 134 mg/dL (13 Mar 2023 15:32)      Weight: Height (cm): 170.2 (02-24 @ 12:00)  Weight (kg): 57.3 (03-09), 55.4 (02-24 @ 12:00)  BMI (kg/m2): 19.1 (02-24 @ 12:00)  Weight Assessment: Pt noted with non-significant wt loss x 3 weeks (-3.4%), possibly r/t fluid shifts as pt noted with edema      Physical Assessment, per flowsheets:  Edema: Generalized 1+, b/l arm 2+  Pressure Injury: left heel DTI     Estimated Needs:   [X] recalculated, based on #, 67 kg   6385-1083 kcal daily @ 25-30kcal/kg,  80-100gm protein daily @ 1.2-1.5gm/kg     Previous Nutrition Diagnosis:   [ ] Inadequate Energy Intake [ ]Inadequate Oral Intake [ ] Excessive Energy Intake   [ ] Underweight [ ] Increased Nutrient Needs [ ] Overweight/Obesity   [ ] Altered GI Function [ ] Unintended Weight Loss [ ] Food & Nutrition Related Knowledge Deficit [ ] Malnutrition   Nutrition Diagnosis is [ ] ongoing  [ ] resolved [ ] not applicable   New Nutrition Diagnosis: [ ] not applicable     Interventions:   1)   2)  3)     Monitor & Evaluate:  PO intake, tolerance to diet/supplement, nutrition related lab values, weight trends, BMs/GI distress, hydration status, skin integrity.    Naida Zamora MS, RDN (Pager #[phone]) | Also available on TEAMS Pt seen for SEVERE MALNUTRITION FOLLOW UP     Medical Course: 68 y/o male HTN, DM2, hypothyroidism, CAD s/p CABG, TANG cirrhosis, follicular lymphoma (on Rituximab OP), chronic Hep B on tenofovir (HBV Core +), presented to the ED w/ chest pain and constipation while in ED developed hematemesis s/p MICU admission for acute blood loss anemia c/b hypovolemic shock  intubated for airway protection (extubated 3/8) s/p 2/24 bedside EGD with banding esophageal varices now with persistent encephalopathy.       Nutrition Course: Unable to conduct nutrition interview 2/2 decreased cognition. Information obtained from comprehensive chart review and per RN today:  No recent episodes of nausea, vomiting, diarrhea or constipation reported, BM noted on 3/13 per RN flowsheets. Pt remains NPO TF only as sole source of nutrition and hydration- glucerna 1.2 @ 40ml/hr x24hrs; 960ml total volume, 1152 kcal, 58 gm protein, 778ml free water from feeding. Current TF regimen does not meet pt's estimated needs. Recommend change TF to glucerna 1.5 @ 45ml/hr x24hrs- 1080ml total volume, 1620 kcal (28 kcal/kg), 89 gm protein (1.5 gm/kg), 821ml free water from formula.     Diet Prescription: Diet, NPO with Tube Feed: 57.3  Tube Feeding Modality: Orogastric  Glucerna 1.2 Nelson (GLUCERNARTH)  Total Volume for 24 Hours (mL): 960  Continuous  Starting Tube Feed Rate {mL per Hour}: 20  Increase Tube Feed Rate by (mL): 5     Every 6 hours  Until Goal Tube Feed Rate (mL per Hour): 40  Tube Feed Duration (in Hours): 24  Tube Feed Start Time: 12:00 (03-13-23 @ 13:52)    Pertinent Medications: MEDICATIONS  (STANDING):  chlorhexidine 2% Cloths 1 Application(s) Topical <User Schedule>  ciprofloxacin     Tablet 500 milliGRAM(s) Oral every 24 hours  coronavirus bivalent (EUA) Booster Vaccine (PFIZER) 0.3 milliLiter(s) IntraMuscular once  dextrose 50% Injectable 25 Gram(s) IV Push once  dextrose 50% Injectable 12.5 Gram(s) IV Push once  dextrose 50% Injectable 25 Gram(s) IV Push once  furosemide    Tablet 20 milliGRAM(s) Oral daily  insulin lispro (ADMELOG) corrective regimen sliding scale   SubCutaneous every 6 hours  insulin NPH human recombinant 10 Unit(s) SubCutaneous every 6 hours  lactulose Syrup 10 Gram(s) Oral three times a day  levothyroxine Injectable 75 MICROGram(s) IV Push at bedtime  pantoprazole  Injectable 40 milliGRAM(s) IV Push daily  rifAXIMin 550 milliGRAM(s) Oral two times a day  spironolactone 50 milliGRAM(s) Oral daily  tenofovir disoproxil fumarate (VIREAD) 300 milliGRAM(s) Oral daily    MEDICATIONS  (PRN):  dextrose Oral Gel 15 Gram(s) Oral once PRN Blood Glucose LESS THAN 70 milliGRAM(s)/deciliter  LORazepam   Injectable 1.5 milliGRAM(s) IV Push once PRN pre-MRI    Pertinent Labs: 03-14 Na147 mmol/L<H> Glu 161 mg/dL<H> K+ 3.7 mmol/L Cr  1.18 mg/dL BUN 55 mg/dL<H> 03-14 Phos 3.4 mg/dL 03-14 Alb 2.6 g/dL<L>    POCT Blood Glucose.: 161 mg/dL (14 Mar 2023 11:38)  POCT Blood Glucose.: 139 mg/dL (14 Mar 2023 06:03)  POCT Blood Glucose.: 128 mg/dL (14 Mar 2023 00:06)  POCT Blood Glucose.: 158 mg/dL (13 Mar 2023 18:18)  POCT Blood Glucose.: 134 mg/dL (13 Mar 2023 15:32)      Weight: Height (cm): 170.2 (02-24 @ 12:00)  Weight (kg): 57.3 (03-09), 55.4 (02-24 @ 12:00)  BMI (kg/m2): 19.1 (02-24 @ 12:00)  Weight Assessment: Pt noted with non-significant wt loss x 3 weeks (-3.4%), possibly r/t fluid shifts as pt noted with edema      Physical Assessment, per flowsheets:  Edema: Generalized 1+, b/l arm 2+  Pressure Injury: left heel DTI     Estimated Needs:   [X] recalculated, based on #, 67 kg   8740-3401 kcal daily @ 25-30kcal/kg,  80-100gm protein daily @ 1.2-1.5gm/kg     Previous Nutrition Diagnosis: [x ] Malnutrition   Nutrition Diagnosis is [ x] ongoing  [ ] resolved [ ] not applicable   New Nutrition Diagnosis: [ x] not applicable     Interventions:   1) Recommend change TF regimen to glucerna 1.5 @ 45ml/hr x 24hrs. adjust flushes as needed  2) Obtain weekly wts  3) RD to f/u prn     Monitor & Evaluate:  TF tolerance nutrition related lab values, weight trends, BMs/GI distress, hydration status, skin integrity.    Naida Zamora MS, RDN (Pager #[phone]) | Also available on TEAMS 36.7

## 2024-03-18 NOTE — ED PROVIDER NOTE - OBJECTIVE STATEMENT
58y M with indwelling castro - placed this evening for urinary retention, was here for gi eval, h/o bph on flomax, tonight was asleep, felt a pain and saw blood around the catheter, castro is uncomfortable, wants it removed

## 2024-03-18 NOTE — ED PROVIDER NOTE - NSFOLLOWUPINSTRUCTIONS_ED_ALL_ED_FT
1) Follow-up with your urologist as scheduled for tomorrow. Call today / next business day for prompt follow-up.  2) Return to Emergency room for any worsening or persistent pain, weakness, fever, or any other concerning symptoms.  3) See attached instruction sheets for additional information, including information regarding signs and symptoms to look out for, reasons to seek immediate care and other important instructions.  4) Follow-up with any specialists as discussed / noted as well.     What is urinary retention? Urinary retention is a condition that develops when your bladder does not empty completely when you urinate.  Male Reproductive System    What causes urinary retention?    An enlarged prostate    Blockages, such as a stone, growth, or narrowing of your urethra    A weak bladder muscle    Nerve damage from diabetes, stroke, or spinal cord injury    Bladder diverticula, which are pockets of urine that form in your bladder and do not empty    Certain medicines, such as narcotics, antihistamines, or antidepressants  What are the signs and symptoms of urinary retention?    Frequent urination, or the urge to urinate right after you finish    An urge to urinate, but your urine does not come out or dribbles out slowly and weakly    Frequent urine leaks that happen during the day or while you sleep    Pain or pressure when you urinate    Pain or stiffness in your abdomen, lower back, hips, or upper thighs    Blood in your urine  How is urinary retention diagnosed? Your healthcare provider will ask about your health history and the medicines you take. Your provider will press or tap on your lower abdomen. You may also need any of the following tests:    A digital rectal exam is when healthcare providers carefully feel the size of your prostate.    A post void residual test will show how much urine is left in your bladder after you urinate. You will be asked to urinate and then healthcare providers will use a small ultrasound machine to check how much urine is left in your bladder.    Blood or urine tests may show infection or prostate specific antigen (PSA) levels. PSA may be elevated in prostate cancer.    An ultrasound uses sound waves to show pictures on a monitor. An ultrasound may be done to show bladder stones, infection, or other problems.    A CT scan, or CAT scan, is a type of x-ray that is taken of your prostate, kidneys, and bladder. The pictures may show what is causing your urinary retention. You may be given a dye before the pictures are taken to help healthcare providers see the pictures better. Tell the healthcare provider if you have ever had an allergic reaction to contrast dye.  How is urinary retention treated?    A Quesada catheter is a tube put into your bladder to drain urine into a bag. Keep the bag below your waist. This will prevent urine from flowing back into your bladder and causing an infection or other problems. Also, keep the tube free of kinks so the urine will drain properly. Do not pull on the catheter. This can cause pain and bleeding, and may cause the catheter to come out.    Medicines can help decrease the size of your prostate, fight infection, and help you urinate more easily.    Surgery may be needed to treat the condition that is causing your urinary retention.  When should I contact my healthcare provider?    You have a fever.    You have pain when you urinate.    You have blood in your urine.    You have problems with your catheter.    You have questions or concerns about your condition or care.  When should I seek immediate care or call 911?    You have severe abdominal pain.    You are breathing faster than usual.    Your heartbeat is faster than usual.    Your face, hands, feet, or ankles are swollen.  CARE AGREEMENT:    You have the right to help plan your care. Learn about your health condition and how it may be treated. Discuss treatment options with your healthcare providers to decide what care you want to receive. You always have the right to refuse treatment.

## 2024-03-18 NOTE — ED ADULT NURSE NOTE - NSFALLUNIVINTERV_ED_ALL_ED
Bed/Stretcher in lowest position, wheels locked, appropriate side rails in place/Call bell, personal items and telephone in reach/Instruct patient to call for assistance before getting out of bed/chair/stretcher/Non-slip footwear applied when patient is off stretcher/Helen to call system/Physically safe environment - no spills, clutter or unnecessary equipment/Purposeful proactive rounding/Room/bathroom lighting operational, light cord in reach

## 2024-03-18 NOTE — ED ADULT TRIAGE NOTE - AS TEMP SITE
[facility]-bilateral mastectomies at [facility]  
oral

## 2024-03-18 NOTE — ED PROVIDER NOTE - PATIENT'S PREFERRED PRONOUN
Him/He Rhomboid Transposition Flap Text: The defect edges were debeveled with a #15 scalpel blade.  Given the location of the defect and the proximity to free margins a rhomboid transposition flap was deemed most appropriate.  Using a sterile surgical marker, an appropriate rhomboid flap was drawn incorporating the defect.    The area thus outlined was incised deep to adipose tissue with a #15 scalpel blade.  The skin margins were undermined to an appropriate distance in all directions utilizing iris scissors.

## 2024-03-18 NOTE — ED ADULT NURSE NOTE - OBJECTIVE STATEMENT
pt to ED; uncomfortable appearing; was seen in this department yesterday and was found to have urinary retention at that time. was d/c home with urinary catheter in place. pt c/o "pain" from catheter and reported there was "blood" around the meatus. no blood seen to drainage bag; urine appear clear. pt requesting removal of urinary catheter

## 2024-03-18 NOTE — ED PROVIDER NOTE - NSTIMEPROVIDERCAREINITIATE_GEN_ER
----- Message from TAD Mccartney sent at 2/15/2018  1:43 PM EST -----  Regarding: PREVIOUS UROLOGY CONSULT  PLEASE OBTAIN PREVIOUS UROLOGY CONSULT AND ANY IMAGING. MAY BE IN ALLSCRIPTS. IF NOT IN ALLSCRIPTS, PLEASE FIND OUT WHO HE SAW AND OBTAIN CONSULT     [facility]   18-Mar-2024 22:33

## 2024-03-18 NOTE — ED PROVIDER NOTE - PATIENT PORTAL LINK FT
You can access the FollowMyHealth Patient Portal offered by [facility] by registering at the following website: [email or web address]. By joining [employer]’s FollowMyHealth portal, you will also be able to view your health information using other applications (apps) compatible with our system.

## 2024-03-18 NOTE — ED PROVIDER NOTE - GENITOURINARY, MLM
uncircumcised, castro in place, small amount dried blood at meatus, castro with clear urine in bag, no clots, castro is able to be pushed into bladder further without resistance

## 2024-03-18 NOTE — ED PROVIDER NOTE - TEMPLATE, MLM
What Is The Reason For Today's Visit?: Full Body Skin Examination
 Symptoms
What Is The Reason For Today's Visit? (Being Monitored For X): concerning skin lesions on a periodic basis

## 2024-03-18 NOTE — ED PROVIDER NOTE - CLINICAL SUMMARY MEDICAL DECISION MAKING FREE TEXT BOX
pt with castro placed earlier tonight for retention, pt c/o discomfort, wants catheter out, understands it may need to be replaced given that he was in retention tonight, but wants to try without, will still f/u with his

## 2024-03-18 NOTE — ED PROVIDER NOTE - PATIENT PORTAL LINK FT
You can access the FollowMyHealth Patient Portal offered by [facility] by registering at the following website: [email or web address]. By joining [employer]’s FollowMyHealth portal, you will also be able to view your health information using other applications (apps) compatible with our system.

## 2024-03-18 NOTE — ED ADULT NURSE REASSESSMENT NOTE - NS ED NURSE REASSESS COMMENT FT1
pt seen and examined. urinary catheter removed as ordered. pt and his wife were instructed on the need for follow up with urology, the need to stay well hydrated and when to return fo ED for urinary retention and/or pain

## 2024-03-18 NOTE — ED PROVIDER NOTE - CARE PLAN
Patient: Abena Hewitt    Procedure Summary     Date:  07/29/20 Room / Location:  [address] [place] [address] / [facility]    Anesthesia Start:  5201 Anesthesia Stop:  1027    Procedure:  XI ROBOT-ASSISTED LAPAROSCOPIC VENTRAL HERNIA REPAIR (N/A ) Diagnosis: 1 Principal Discharge DX:	Quesada catheter problem

## 2024-03-18 NOTE — ED ADULT NURSE NOTE - CHPI ED NUR SYMPTOMS POS
MG TABLET    TAKE 1 TABLET TWICE A DAY    OMEPRAZOLE (PRILOSEC) 40 MG DELAYED RELEASE CAPSULE    Take 1 capsule by mouth every morning (before breakfast)    PRAVASTATIN (PRAVACHOL) 80 MG TABLET    TAKE 1 TABLET NIGHTLY       ALLERGIES     Mobic [meloxicam]; Morphine; and Other    FAMILY HISTORY           Problem Relation Age of Onset    Stroke Mother     Diabetes Mother     Heart Disease Mother     High Blood Pressure Mother     High Cholesterol Mother     Cancer Neg Hx      Family Status   Relation Name Status    Mother   at age 79        Stroke    Father  Alive    Neg Hx  (Not Specified)        SOCIAL HISTORY      reports that she has been smoking cigarettes. She has a 15.00 pack-year smoking history. She has never used smokeless tobacco. She reports that she does not drink alcohol or use drugs. PHYSICAL EXAM    (up to 7 for level 4, 8 or more for level 5)     ED Triage Vitals [19 1850]   BP Temp Temp Source Pulse Resp SpO2 Height Weight   (!) 146/81 98.3 °F (36.8 °C) Oral 79 18 98 % 5' 7\" (1.702 m) 272 lb 0.8 oz (123.4 kg)     Physical Exam   Constitutional: She is oriented to person, place, and time. She appears well-developed and well-nourished. No distress. HENT:   Head: Normocephalic and atraumatic. Eyes: Pupils are equal, round, and reactive to light. Neck: Normal range of motion. Cardiovascular: Regular rhythm and normal heart sounds. Tachycardia present. Pulmonary/Chest: Effort normal. No respiratory distress. She has wheezes. Musculoskeletal: Normal range of motion. She exhibits no edema. Neurological: She is alert and oriented to person, place, and time. No cranial nerve deficit. Skin: Skin is warm. No erythema. Psychiatric: She has a normal mood and affect. Her behavior is normal.   Nursing note and vitals reviewed.       DIAGNOSTIC RESULTS     EKG: All EKG's are interpreted by Jonny Ganser, PA in the absence of a cardiologist.    EKG interpreted by myself - please refer to attending physician's note for complete EKG interpretation:    Rhythm: sinus rhythm   No evidence of acute ischemia or injury. RADIOLOGY:   Non-plain film images such as CT, Ultrasound and MRI are read by the radiologist. Plain radiographic images are visualized and preliminarily interpreted by HARRISON Bello with the below findings:    Reviewed radiologist dictation. Interpretation per the Radiologist below, if available at the time of this note:    XR CHEST STANDARD (2 VW)   Final Result   No acute process.                LABS:  Labs Reviewed   CBC WITH AUTO DIFFERENTIAL - Abnormal; Notable for the following components:       Result Value    RBC 3.89 (*)     All other components within normal limits    Narrative:     Performed at:  [address] [facility] [employer] [employer]   Phone [phone]   BASIC METABOLIC PANEL W/ REFLEX TO MG FOR LOW K - Abnormal; Notable for the following components:    CO2 16 (*)     Glucose 102 (*)     BUN 27 (*)     CREATININE 1.5 (*)     GFR Non- 35 (*)     GFR  43 (*)     All other components within normal limits    Narrative:     Performed at:  [address] [facility]   Phone [phone]   HEPATIC FUNCTION PANEL - Abnormal; Notable for the following components:    ALT <5 (*)     All other components within normal limits    Narrative:     Performed at:  [address] [facility]   Phone [phone]   BRAIN NATRIURETIC PEPTIDE - Abnormal; Notable for the following components:    Pro-BNP 3,114 (*)     All other components within normal limits    Narrative:     Performed at:  [address] [facility]   Phone [phone]   TROPONIN    Narrative:     Performed at:  [facility]

## 2024-03-18 NOTE — ED ADULT NURSE NOTE - OBJECTIVE STATEMENT
pt comes in c.o unable to urinate, pt states he had Quesada removed in ed yesterday bc it was bothering him. now comes in w/ suprapubic abd pain. Quesada placed in ED today with immediate relief of pain.

## 2024-03-18 NOTE — ED PROVIDER NOTE - NS ED MD DISPO DISCHARGE
Anomaly of diaphragm  chest surgery at age 1  History of bilateral mastectomy    S/P D&C    S/P T&A (status post tonsillectomy and adenoidectomy) Home

## 2024-03-18 NOTE — ED PROVIDER NOTE - OBJECTIVE STATEMENT
58-year-old male history of high cholesterol hypertension complaining about urinary retention tonight.  Having difficulty urinating.  Was seen yesterday in the ED for similar complaint but returned to the ED because he wanted the Quesada catheter removed.  Denies any fever chills nausea vomiting.

## 2024-03-18 NOTE — ED ADULT TRIAGE NOTE - CHIEF COMPLAINT QUOTE
I had a catheter in but I asked them to remove it yesterday; I have not urinated in 1 hour; I feel like I have to urinate and I have a lot of pain in my penis

## 2024-03-19 ENCOUNTER — APPOINTMENT (OUTPATIENT)
Dept: UROLOGY | Facility: CLINIC | Age: 59
End: 2024-03-19
Payer: COMMERCIAL

## 2024-03-19 ENCOUNTER — APPOINTMENT (OUTPATIENT)
Dept: CARDIOLOGY | Facility: CLINIC | Age: 59
End: 2024-03-19

## 2024-03-19 ENCOUNTER — APPOINTMENT (OUTPATIENT)
Dept: UROLOGY | Facility: CLINIC | Age: 59
End: 2024-03-19

## 2024-03-19 VITALS
SYSTOLIC BLOOD PRESSURE: 154 MMHG | DIASTOLIC BLOOD PRESSURE: 95 MMHG | HEART RATE: 94 BPM | HEIGHT: 66 IN | WEIGHT: 179 LBS | BODY MASS INDEX: 28.77 KG/M2

## 2024-03-19 DIAGNOSIS — R33.9 RETENTION OF URINE, UNSPECIFIED: ICD-10-CM

## 2024-03-19 PROCEDURE — 51700 IRRIGATION OF BLADDER: CPT

## 2024-03-19 PROCEDURE — A4216: CPT | Mod: NC

## 2024-03-19 PROCEDURE — 99214 OFFICE O/P EST MOD 30 MIN: CPT | Mod: 25

## 2024-03-22 ENCOUNTER — APPOINTMENT (OUTPATIENT)
Dept: UROLOGY | Facility: CLINIC | Age: 59
End: 2024-03-22
Payer: COMMERCIAL

## 2024-03-22 PROCEDURE — 99214 OFFICE O/P EST MOD 30 MIN: CPT | Mod: 25

## 2024-03-22 PROCEDURE — 51741 ELECTRO-UROFLOWMETRY FIRST: CPT

## 2024-03-24 PROBLEM — R33.9 URINARY RETENTION: Status: ACTIVE | Noted: 2024-03-20

## 2024-03-24 NOTE — LETTER BODY
[Consult Letter:] : I had the pleasure of evaluating your patient, [unfilled]. [Dear  ___] : Dear  [unfilled], [( Thank you for referring [unfilled] for consultation for _____ )] : Thank you for referring [unfilled] for consultation for [unfilled] [Consult Closing:] : Thank you very much for allowing me to participate in the care of this patient.  If you have any questions, please do not hesitate to contact me. [Please see my note below.] : Please see my note below. [Sincerely,] : Sincerely, [Courtesy Letter:] : I had the pleasure of seeing your patient, [unfilled], in my office today. [FreeTextEntry3] : Preston Elam MD  of Urology [facility] [employer]  [facility] Offices: [address] [facility], [address] [place] [address], [facility] [address] [facility], [place] [address]  TEL: [phone] FAX: [phone]

## 2024-03-24 NOTE — ASSESSMENT
[FreeTextEntry1] : Reviewed records. Discussed labs and imaging.   3/17/2024: WBC: 16.41 Creatinine: 0.84 Urinalysis: Small leukocyte esterase, microscopy negative No urine culture CT scan abdomen pelvis: KIDNEYS/URETERS: There are a few sub-centimeter low-attenuation bilateral renal lesions that are too small to characterize though statistically likely represent small cysts. BLADDER: Within normal limits. REPRODUCTIVE ORGANS: The prostate gland is markedly enlarged measuring approximately 7.2 cm in transverse dimension and there is hypertrophy of the median lobe, bulging into the bladder base.  Benign Prostatic Hyperplasia: Urinary retention: Had trial of void.  Was able to urinate small amount. Had clean intermittent catheterization teaching. Discussed treatment options: continued medical management with alpha blocker and or 5 alpha reductase inhibitors with or without Clean intermittent catheterization/Indwelling Quesada catheter Vs Surgery: Transurethral resection/vaporization/ablation including Aquablation of Prostate and Simple prostatectomy: open Vs robotic after appropriate work up. Also discussed emerging minimally invasive surgical therapies: Prostatic urethral lift (Urolift), Water vapor thermal therapy (Rezum) and Transurethral anterior prostate commissurotomy and drug delivery (Optilume).  Discussed risks and benefits of each. Patient will consider his options. Will increase Flomax to twice daily.  Return to office on 3/22/2024 or sooner if any issues: Will do uroflow and check postvoid residual.

## 2024-03-24 NOTE — PHYSICAL EXAM
[Normal Appearance] : normal appearance [General Appearance - In No Acute Distress] : no acute distress [] : no respiratory distress [Oriented To Time, Place, And Person] : oriented to person, place, and time [Normal Station and Gait] : the gait and station were normal for the patient's age [Abdomen Soft] : soft [Abdomen Tenderness] : non-tender

## 2024-03-24 NOTE — ASSESSMENT
[FreeTextEntry1] : Benign Prostatic Hyperplasia: See uroflow and post void residual.  Once again discussed treatment options.  Will continue Flomax twice daily for now.  Fatigue: Will get Total and Free Testosterone, Vitamin D and TSH.  Return to office in 2 weeks or sooner if any issues.

## 2024-03-24 NOTE — HISTORY OF PRESENT ILLNESS
[FreeTextEntry1] : 58-year-old male presents for follow up.  Taking Flomax BID.  Also taking Cefuroxime. Urinating every 30 minutes or so during the day and nocturia 1 to 2 x.   Did not require to do clean intermittent catheterization. Today mentions has work related stress for last few months and has had poor appetite, generalized weakness lethargy and 17-pound weight loss.  Had discussed with PCP and diabetes medicine was changed.  Seen on 3/19/2024 for urinary retention. Went to [facility] ED on 3/17/2024 with complaints of generalized weakness, nausea and loss of appetite.  Received intravenous fluids.  Patient subsequently developed urinary retention required indwelling Quesada catheter.  Had a lot of discomfort from the catheter with gross hematuria. Patient went to the ED again, the following day and had Quesada removed however it had to be reinserted later on in the evening. Before the incident was taking Flomax and was urinating at baseline.  Was taking Tadalafil 5 mg daily also.  CT urogram (9/28/2023): KIDNEYS/URETERS: No stones, renal masses or evidence of a urothelial lesion. Few subcentimeter left renal hypodensities, most likely tiny cysts. BLADDER: Within normal limits. REPRODUCTIVE ORGANS: Prostate is enlarged with median lobe hypertrophy indenting bladder base.  PSA: 9.61 (5/17/2023). MRI prostate (1/24/2022) done for PSA of 9: Prostate volume: 117 cc. Right anterior transition zone apex 8 mm PI-RADS 3 lesion.  Seen on 9/21/2023 for elevated PSA. Had been told has elevated PSA.  Used to follow with urologist: Dr. Paddy Ivy. Was recommended prostate biopsy, did not proceed.  Had MRI prostate. Denied any recent unintentional weight loss, night sweats and new bone or back pain. No family history of Prostate cancer.  Reported reasonable stream, urinates 3 to 4 x or so during the day and nocturia of 3 x. Endorsed on and off hesitancy. Denied straining, intermittency, urgency, incontinence or sense of incomplete emptying. Denied dysuria, lower abdominal or flank pain, fever, chills or rigors. Had gross hematuria at the beginning of urination along with some difficulty with urination, resolved. Had history of kidney stones, status post ureteroscopy in 1995 in [place] and passing a stone in 1997. Non-smoker. Took Tadalafil 5 mg daily.  Rated erections: 5/5, without 2/5.  Complained of on and off painful erection.

## 2024-03-24 NOTE — HISTORY OF PRESENT ILLNESS
[FreeTextEntry1] : 58-year-old male presents for urinary retention. Went to [facility] ED on 3/17/2024 with complaints of generalized weakness, nausea and loss of appetite.  Received intravenous fluids.  Patient subsequently developed urinary retention required indwelling Quesada catheter.  Had a lot of discomfort from the catheter with gross hematuria. Patient went to the ED again, the following day and had Quesada removed however it had to be reinserted later on in the evening. Before the incident was taking Flomax and was urinating at baseline urination.  Was taking Tadalafil 5 mg daily also.  CT urogram (9/28/2023): KIDNEYS/URETERS: No stones, renal masses or evidence of a urothelial lesion. Few subcentimeter left renal hypodensities, most likely tiny cysts. BLADDER: Within normal limits. REPRODUCTIVE ORGANS: Prostate is enlarged with median lobe hypertrophy indenting bladder base.  PSA: 9.61 (5/17/2023). MRI prostate (1/24/2022) done for PSA of 9: Prostate volume: 117 cc. Right anterior transition zone apex 8 mm PI-RADS 3 lesion.  Seen on 9/21/2023 for elevated PSA. Had been told has elevated PSA.  Used to follow with urologist: Dr. Paddy Ivy. Was recommended prostate biopsy, did not proceed.  Had MRI prostate. Denied any recent unintentional weight loss, night sweats and new bone or back pain. No family history of Prostate cancer.  Reported reasonable stream, urinates 3 to 4 x or so during the day and nocturia of 3 x. Endorsed on and off hesitancy. Denied straining, intermittency, urgency, incontinence or sense of incomplete emptying. Denied dysuria, lower abdominal or flank pain, fever, chills or rigors. Had gross hematuria at the beginning of urination along with some difficulty with urination, resolved. Had history of kidney stones, status post ureteroscopy in 1995 in [place] and passing a stone in 1997. Non-smoker. Took Tadalafil 5 mg daily.  Rated erections: 5/5, without 2/5.  Complained of on and off painful erection.

## 2024-03-24 NOTE — REVIEW OF SYSTEMS
[Heartburn] : heartburn [Urine Infection (bladder/kidney)] : bladder/kidney infection [Blood in urine that you can see] : blood visible in urine [Negative] : Heme/Lymph [see HPI] : see HPI

## 2024-03-24 NOTE — REVIEW OF SYSTEMS
[Negative] : Heme/Lymph [Heartburn] : heartburn [see HPI] : see HPI [Blood in urine that you can see] : blood visible in urine [Urine Infection (bladder/kidney)] : bladder/kidney infection

## 2024-03-24 NOTE — LETTER BODY
[Dear  ___] : Dear  [unfilled], [Courtesy Letter:] : I had the pleasure of seeing your patient, [unfilled], in my office today. [Please see my note below.] : Please see my note below. [Sincerely,] : Sincerely, [( Thank you for referring [unfilled] for consultation for _____ )] : Thank you for referring [unfilled] for consultation for [unfilled] [Consult Closing:] : Thank you very much for allowing me to participate in the care of this patient.  If you have any questions, please do not hesitate to contact me. [FreeTextEntry3] : Preston Elam MD  of Urology [facility]  The [facility] Offices: [address] [facility], [address] [place] [address], [facility] [address] [facility], [place] [address]  TEL: [phone] FAX: [phone]

## 2024-03-24 NOTE — PHYSICAL EXAM
[General Appearance - In No Acute Distress] : no acute distress [Normal Appearance] : normal appearance [] : no respiratory distress [Abdomen Soft] : soft [Abdomen Tenderness] : non-tender [Urethral Meatus] : meatus normal [Penis Abnormality] : normal uncircumcised penis [Scrotum] : the scrotum was normal [Testes Tenderness] : no tenderness of the testes [Epididymis] : the epididymides were normal [Testes Mass (___cm)] : there were no testicular masses [Normal Station and Gait] : the gait and station were normal for the patient's age [Skin Color & Pigmentation] : normal skin color and pigmentation [Oriented To Time, Place, And Person] : oriented to person, place, and time [No Focal Deficits] : no focal deficits [No Palpable Adenopathy] : no palpable adenopathy [FreeTextEntry1] : Quesada to leg bag draining clear urine

## 2024-03-24 NOTE — REASON FOR VISIT
[Initial Visit ___] : [unfilled] is here today for an initial visit  for [unfilled] [Follow-up Visit ___] : a follow-up visit  for [unfilled] [Other: _____] : [unfilled]

## 2024-04-02 ENCOUNTER — TRANSCRIPTION ENCOUNTER (OUTPATIENT)
Age: 59
End: 2024-04-02

## 2024-04-05 ENCOUNTER — APPOINTMENT (OUTPATIENT)
Dept: UROLOGY | Facility: CLINIC | Age: 59
End: 2024-04-05
Payer: COMMERCIAL

## 2024-04-05 DIAGNOSIS — N52.9 MALE ERECTILE DYSFUNCTION, UNSPECIFIED: ICD-10-CM

## 2024-04-05 LAB
25(OH)D3 SERPL-MCNC: 23.6 NG/ML
TESTOST FREE SERPL-MCNC: 3 PG/ML
TESTOST SERPL-MCNC: 323 NG/DL
TSH SERPL-ACNC: 2.44 UIU/ML

## 2024-04-05 PROCEDURE — 99214 OFFICE O/P EST MOD 30 MIN: CPT

## 2024-04-05 NOTE — REVIEW OF SYSTEMS
[Heartburn] : heartburn [see HPI] : see HPI [Urine Infection (bladder/kidney)] : bladder/kidney infection [Blood in urine that you can see] : blood visible in urine [Negative] : Heme/Lymph

## 2024-04-07 NOTE — LETTER BODY
[Dear  ___] : Dear  [unfilled], [Courtesy Letter:] : I had the pleasure of seeing your patient, [unfilled], in my office today. [( Thank you for referring [unfilled] for consultation for _____ )] : Thank you for referring [unfilled] for consultation for [unfilled] [Please see my note below.] : Please see my note below. [Consult Closing:] : Thank you very much for allowing me to participate in the care of this patient.  If you have any questions, please do not hesitate to contact me. [Sincerely,] : Sincerely, [FreeTextEntry3] : Preston Elam MD  of Urology [facility]  [facility] Offices: [address] [facility], [address] [place] [address], [facility] [address] [facility], [place] [address]  TEL: [phone] FAX: [phone]

## 2024-04-07 NOTE — ASSESSMENT
[FreeTextEntry1] : Reviewed records. Discussed labs.   Benign Prostatic Hyperplasia: Incomplete bladder emptying: Prostate volume: 117 cc on MRI prostate dated 1/24/2022. Once again discussed treatment options including adding 5 alpha reductase inhibitor and surgical procedures. Will continue Flomax twice daily for now.  Fatigue: Hypogonadism: Erectile dysfunction: Recommended to take over-the-counter vitamin D supplement. Discussed treatment options.  Will continue tadalafil 5 mg daily. Discussed risks and benefits of Testosterone replacement therapy including effect on spermatogenesis.  Wants to proceed with Testosterone replacement therapy: In office testosterone injections. Will get full panel fasting labs: Free and Total Testosterone, FSH, LH, prolactin, Complete blood count, Comprehensive metabolic panel, Hgb A1c and Lipid profile.  Return to office in 1 week or sooner if any issues: Will give testosterone injection.

## 2024-04-07 NOTE — HISTORY OF PRESENT ILLNESS
[FreeTextEntry1] : Primary care physician: Dr Foster Salcedo.   58-year-old male presents for follow up.  Taking Flomax BID.  Urinating every 30 minutes to 1 hours or so during the day and no nocturia.  After driving develops hesitancy. Denies urinary incontinence.  Denies dysuria, hematuria, lower abdominal or flank pain, nausea, vomiting, fever, chills or rigors.  Did not have to do clean intermittent catheterization. Stress situation is better. Still feels lethargic and has poor appetite. Did blood work.  Seen on 3/19/2024 for urinary retention. Went to [facility] ED on 3/17/2024 with complaints of generalized weakness, nausea and loss of appetite.  Received intravenous fluids.  Patient subsequently developed urinary retention required indwelling Quesada catheter.  Had a lot of discomfort from the catheter with gross hematuria. Patient went to the ED again, the following day and had Quesada removed however it had to be reinserted later on in the evening. Before the incident was taking Flomax and was urinating at baseline.  Was taking Tadalafil 5 mg daily also. Had trial of void and was able to urinate.  CT urogram (9/28/2023): KIDNEYS/URETERS: No stones, renal masses or evidence of a urothelial lesion. Few sub-centimeter left renal hypodensities, most likely tiny cysts. BLADDER: Within normal limits. REPRODUCTIVE ORGANS: Prostate is enlarged with median lobe hypertrophy indenting bladder base.  PSA: 9.61 (5/17/2023). MRI prostate (1/24/2022) done for PSA of 9: Prostate volume: 117 cc. Right anterior transition zone apex 8 mm PI-RADS 3 lesion.  Seen on 9/21/2023 for elevated PSA. Had been told has elevated PSA.  Used to follow with urologist: Dr. Paddy Ivy. Was recommended prostate biopsy, did not proceed.  Had MRI prostate. Denied any recent unintentional weight loss, night sweats and new bone or back pain. No family history of Prostate cancer.  Reported reasonable stream, urinates 3 to 4 x or so during the day and nocturia of 3 x. Endorsed on and off hesitancy. Denied straining, intermittency, urgency, incontinence or sense of incomplete emptying. Denied dysuria, lower abdominal or flank pain, fever, chills or rigors. Had gross hematuria at the beginning of urination along with some difficulty with urination, resolved. Had history of kidney stones, status post ureteroscopy in 1995 in [place] and passing a stone in 1997. Non-smoker. Took Tadalafil 5 mg daily.  Rated erections: 5/5, without 2/5.  Complained of on and off painful erection.

## 2024-04-08 ENCOUNTER — RX RENEWAL (OUTPATIENT)
Age: 59
End: 2024-04-08

## 2024-04-08 RX ORDER — METOPROLOL SUCCINATE 50 MG/1
50 TABLET, EXTENDED RELEASE ORAL
Qty: 270 | Refills: 2 | Status: ACTIVE | COMMUNITY
Start: 2020-10-08

## 2024-04-12 ENCOUNTER — APPOINTMENT (OUTPATIENT)
Dept: UROLOGY | Facility: CLINIC | Age: 59
End: 2024-04-12

## 2024-04-19 ENCOUNTER — EMERGENCY (EMERGENCY)
Facility: HOSPITAL | Age: 59
LOS: 1 days | Discharge: HOME | End: 2024-04-19
Attending: STUDENT IN AN ORGANIZED HEALTH CARE EDUCATION/TRAINING PROGRAM
Payer: COMMERCIAL

## 2024-04-19 VITALS
HEART RATE: 70 BPM | HEIGHT: 66 IN | DIASTOLIC BLOOD PRESSURE: 97 MMHG | WEIGHT: 175.05 LBS | TEMPERATURE: 98 F | SYSTOLIC BLOOD PRESSURE: 170 MMHG | OXYGEN SATURATION: 100 % | RESPIRATION RATE: 18 BRPM

## 2024-04-19 PROCEDURE — 99282 EMERGENCY DEPT VISIT SF MDM: CPT

## 2024-04-19 PROCEDURE — 99283 EMERGENCY DEPT VISIT LOW MDM: CPT

## 2024-04-19 NOTE — ED PROVIDER NOTE - NSFOLLOWUPCLINICSTOKEN_GEN_ALL_ED_FT
[id number]: || ||00\01||False;[id number]: || ||00\01||False;084725: || ||00\01||False;[id number]: || ||00\01||False;

## 2024-04-19 NOTE — ED PROVIDER NOTE - PATIENT PORTAL LINK FT
You can access the FollowMyHealth Patient Portal offered by [facility] by registering at the following website: [email or web address]. By joining [employer]’s FollowMyHealth portal, you will also be able to view your health information using other applications (apps) compatible with our system.

## 2024-04-19 NOTE — ED ADULT NURSE NOTE - OBJECTIVE STATEMENT
Pt presents for eval of a burning sensation along his spine x 5 days, no rash, denies trauma.  He is able to walk and bear weight without problems.  Reports difficulty sleeping due to this burning sensation.

## 2024-04-19 NOTE — ED PROVIDER NOTE - ATTENDING CONTRIBUTION TO CARE
Low concern for emergency at this time. CT last month did not show cancer. Unlikely that he developed one in that time. His weight loss and appetite loss does warrant a work-up but in an outpatient setting. He is not currently having back pain or burning. Very comfortable, vitals normal. No utility in labs, emergent CT imaging at this time. Offered labs to patient, but he declined. Patient to be discharged.

## 2024-04-19 NOTE — ED PROVIDER NOTE - CLINICAL SUMMARY MEDICAL DECISION MAKING FREE TEXT BOX
Pt is stable, afebrile in ED. Denies any positive ROS sxs other than appetite loss and weight loss, Low concern for malignancy. Low concern for shingles. No rash visualized on exam.

## 2024-04-19 NOTE — ED PROVIDER NOTE - PHYSICAL EXAMINATION
GENERAL: NAD  HEENT:  Atraumatic  CHEST/LUNG: Chest rise equal bilaterally  HEART: Regular rate and rhythm  ABDOMEN: Soft, Nontender, Nondistended  EXTREMITIES:  Extremities warm  PSYCH: A&Ox3  SKIN: No obvious rashes or lesions  MSK: No midline bony tenderness.

## 2024-04-19 NOTE — ED PROVIDER NOTE - NSFOLLOWUPCLINICS_GEN_ALL_ED_FT
Gastroenterology at [facility]  Gastroenterology  [address] [place] [address]  Phone: [phone]  Fax:     [facility] at [facility]  Gastroenterology  [address] [place] [address]  Phone: [phone]  Fax: [phone]    [facility]  Gastroenterology  [address] [address]  Phone: [phone]  Fax:     [facility]  Gastroenterology  [address] [place] [address]  Phone: [phone]  Fax: [phone]

## 2024-04-19 NOTE — ED ADULT NURSE NOTE - NSFALLUNIVINTERV_ED_ALL_ED
Bed/Stretcher in lowest position, wheels locked, appropriate side rails in place/Call bell, personal items and telephone in reach/Instruct patient to call for assistance before getting out of bed/chair/stretcher/Non-slip footwear applied when patient is off stretcher/[place] to call system/Physically safe environment - no spills, clutter or unnecessary equipment/Purposeful proactive rounding/Room/bathroom lighting operational, light cord in reach

## 2024-04-23 RX ORDER — TAMSULOSIN HYDROCHLORIDE 0.4 MG/1
0.4 CAPSULE ORAL TWICE DAILY
Qty: 180 | Refills: 0 | Status: ACTIVE | COMMUNITY
Start: 2023-09-21 | End: 1900-01-01

## 2024-04-23 RX ORDER — CEFUROXIME AXETIL 500 MG/1
500 TABLET ORAL
Qty: 10 | Refills: 0 | Status: COMPLETED | COMMUNITY
Start: 2024-03-20 | End: 2024-04-23

## 2024-05-02 ENCOUNTER — APPOINTMENT (OUTPATIENT)
Dept: UROLOGY | Facility: CLINIC | Age: 59
End: 2024-05-02
Payer: COMMERCIAL

## 2024-05-02 VITALS
OXYGEN SATURATION: 100 % | DIASTOLIC BLOOD PRESSURE: 70 MMHG | BODY MASS INDEX: 28.12 KG/M2 | HEART RATE: 78 BPM | HEIGHT: 66 IN | WEIGHT: 175 LBS | RESPIRATION RATE: 16 BRPM | SYSTOLIC BLOOD PRESSURE: 120 MMHG

## 2024-05-02 DIAGNOSIS — R53.83 OTHER FATIGUE: ICD-10-CM

## 2024-05-02 DIAGNOSIS — N13.8 BENIGN PROSTATIC HYPERPLASIA WITH LOWER URINARY TRACT SYMPMS: ICD-10-CM

## 2024-05-02 DIAGNOSIS — N40.1 BENIGN PROSTATIC HYPERPLASIA WITH LOWER URINARY TRACT SYMPMS: ICD-10-CM

## 2024-05-02 DIAGNOSIS — R33.9 RETENTION OF URINE, UNSPECIFIED: ICD-10-CM

## 2024-05-02 DIAGNOSIS — E29.1 TESTICULAR HYPOFUNCTION: ICD-10-CM

## 2024-05-02 LAB
ALBUMIN SERPL ELPH-MCNC: 4.6 G/DL
ALP BLD-CCNC: 130 U/L
ALT SERPL-CCNC: 35 U/L
ANION GAP SERPL CALC-SCNC: 11 MMOL/L
AST SERPL-CCNC: 23 U/L
BILIRUB SERPL-MCNC: 0.6 MG/DL
BUN SERPL-MCNC: 5 MG/DL
CALCIUM SERPL-MCNC: 10.3 MG/DL
CHLORIDE SERPL-SCNC: 97 MMOL/L
CHOLEST SERPL-MCNC: 207 MG/DL
CO2 SERPL-SCNC: 29 MMOL/L
CREAT SERPL-MCNC: 0.88 MG/DL
EGFR: 98 ML/MIN/1.73M2
ESTIMATED AVERAGE GLUCOSE: 140 MG/DL
FSH SERPL-MCNC: 3.1 IU/L
GLUCOSE SERPL-MCNC: 135 MG/DL
HBA1C MFR BLD HPLC: 6.5 %
HCT VFR BLD CALC: 42.7 %
HDLC SERPL-MCNC: 35 MG/DL
HGB BLD-MCNC: 13.7 G/DL
LDLC SERPL CALC-MCNC: 129 MG/DL
LH SERPL-ACNC: 5.9 IU/L
MCHC RBC-ENTMCNC: 28.8 PG
MCHC RBC-ENTMCNC: 32.1 GM/DL
MCV RBC AUTO: 89.7 FL
NONHDLC SERPL-MCNC: 172 MG/DL
PLATELET # BLD AUTO: 300 K/UL
POTASSIUM SERPL-SCNC: 4.5 MMOL/L
PROLACTIN SERPL-MCNC: 7.6 NG/ML
PROT SERPL-MCNC: 6.9 G/DL
RBC # BLD: 4.76 M/UL
RBC # FLD: 13 %
SODIUM SERPL-SCNC: 137 MMOL/L
TESTOST FREE SERPL-MCNC: 0.2 PG/ML
TESTOST SERPL-MCNC: 254 NG/DL
TRIGL SERPL-MCNC: 238 MG/DL
WBC # FLD AUTO: 14.54 K/UL

## 2024-05-02 PROCEDURE — 96372 THER/PROPH/DIAG INJ SC/IM: CPT

## 2024-05-02 PROCEDURE — 99214 OFFICE O/P EST MOD 30 MIN: CPT | Mod: 25

## 2024-05-02 RX ORDER — TESTOSTERONE CYPIONATE 200 MG/ML
200 INJECTION, SOLUTION INTRAMUSCULAR
Refills: 0 | Status: COMPLETED | OUTPATIENT
Start: 2024-05-02

## 2024-05-02 RX ORDER — TESTOSTERONE CYPIONATE 200 MG/ML
200 INJECTION, SOLUTION INTRAMUSCULAR
Qty: 1 | Refills: 0 | Status: ACTIVE | OUTPATIENT
Start: 2024-05-02

## 2024-05-02 RX ADMIN — TESTOSTERONE CYPIONATE 0 MG/ML: 200 INJECTION INTRAMUSCULAR at 00:00

## 2024-05-03 LAB
APPEARANCE: CLEAR
BACTERIA: NEGATIVE /HPF
BILIRUBIN URINE: NEGATIVE
BLOOD URINE: NEGATIVE
CAST: 0 /LPF
COLOR: YELLOW
EPITHELIAL CELLS: 0 /HPF
GLUCOSE QUALITATIVE U: 100 MG/DL
KETONES URINE: NEGATIVE MG/DL
LEUKOCYTE ESTERASE URINE: ABNORMAL
MICROSCOPIC-UA: NORMAL
NITRITE URINE: NEGATIVE
PH URINE: 7
PROTEIN URINE: NEGATIVE MG/DL
RED BLOOD CELLS URINE: 0 /HPF
SPECIFIC GRAVITY URINE: 1
UROBILINOGEN URINE: 0.2 MG/DL
WHITE BLOOD CELLS URINE: 0 /HPF

## 2024-05-05 LAB — BACTERIA UR CULT: NORMAL

## 2024-05-08 ENCOUNTER — APPOINTMENT (OUTPATIENT)
Dept: UROLOGY | Facility: CLINIC | Age: 59
End: 2024-05-08

## 2024-05-09 ENCOUNTER — APPOINTMENT (OUTPATIENT)
Dept: UROLOGY | Facility: CLINIC | Age: 59
End: 2024-05-09

## 2024-05-11 PROBLEM — E29.1 HYPOGONADISM IN MALE: Status: ACTIVE | Noted: 2024-04-07

## 2024-05-11 PROBLEM — R33.9 INCOMPLETE BLADDER EMPTYING: Status: ACTIVE | Noted: 2024-04-07

## 2024-05-11 PROBLEM — R53.83 FATIGUE: Status: ACTIVE | Noted: 2020-07-13

## 2024-05-11 NOTE — LETTER BODY
[Dear  ___] : Dear  [unfilled], [Courtesy Letter:] : I had the pleasure of seeing your patient, [unfilled], in my office today. [( Thank you for referring [unfilled] for consultation for _____ )] : Thank you for referring [unfilled] for consultation for [unfilled] [Please see my note below.] : Please see my note below. [Consult Closing:] : Thank you very much for allowing me to participate in the care of this patient.  If you have any questions, please do not hesitate to contact me. [Sincerely,] : Sincerely, [FreeTextEntry3] : Preston Elam MD  of Urology [facility]  The [facility] Offices: [address] [facility], [address] [place] [address], [facility] [address] [facility], [place] [address]  TEL: [phone] FAX: [phone]

## 2024-05-11 NOTE — HISTORY OF PRESENT ILLNESS
[FreeTextEntry1] : Primary care physician: Dr Foster Salcedo.   58-year-old male presents for follow up.  Still feels fatigued and low energy levels with poor appetite. Taking Flomax BID.  Urinating every 30 minutes to 1 hour or so during the day and nocturia 2 x.  After driving develops hesitancy. Denies urinary incontinence.  Has on and off dysuria.  Denies hematuria, lower abdominal or flank pain, nausea, vomiting, fever, chills or rigors.  Did not do clean intermittent catheterization.  Seen on 3/19/2024 for urinary retention. Went to [facility] ED on 3/17/2024 with complaints of generalized weakness, nausea and loss of appetite.  Received intravenous fluids.  Patient subsequently developed urinary retention required indwelling Quesada catheter.  Had a lot of discomfort from the catheter with gross hematuria. Patient went to the ED again, the following day and had Quesada removed however it had to be reinserted later on in the evening. Before the incident was taking Flomax and was urinating at baseline.  Was taking Tadalafil 5 mg daily also. Had trial of void and was able to urinate.  CT urogram (9/28/2023): KIDNEYS/URETERS: No stones, renal masses or evidence of a urothelial lesion. Few sub-centimeter left renal hypodensities, most likely tiny cysts. BLADDER: Within normal limits. REPRODUCTIVE ORGANS: Prostate is enlarged with median lobe hypertrophy indenting bladder base.  PSA: 9.61 (5/17/2023). MRI prostate (1/24/2022) done for PSA of 9: Prostate volume: 117 cc. Right anterior transition zone apex 8 mm PI-RADS 3 lesion.  Seen on 9/21/2023 for elevated PSA. Had been told has elevated PSA.  Used to follow with urologist: Dr. Paddy Ivy. Was recommended prostate biopsy, did not proceed.  Had MRI prostate. Denied any recent unintentional weight loss, night sweats and new bone or back pain. No family history of Prostate cancer.  Reported reasonable stream, urinates 3 to 4 x or so during the day and nocturia of 3 x. Endorsed on and off hesitancy. Denied straining, intermittency, urgency, incontinence or sense of incomplete emptying. Denied dysuria, lower abdominal or flank pain, fever, chills or rigors. Had gross hematuria at the beginning of urination along with some difficulty with urination, resolved. Had history of kidney stones, status post ureteroscopy in 1995 in [place] and passing a stone in 1997. Non-smoker. Took Tadalafil 5 mg daily.  Rated erections: 5/5, without 2/5.  Complained of on and off painful erection.

## 2024-05-11 NOTE — ASSESSMENT
[FreeTextEntry1] : Reviewed records. Discussed labs.   Benign Prostatic Hyperplasia: Incomplete bladder emptying: Prostate volume: 117 cc on MRI prostate dated 1/24/2022. PVR: 165 ml.  Will get Urinalysis and Urine culture  Will continue Flomax twice daily for now.  Fatigue: Hypogonadism: Discussed risks and benefits of Testosterone replacement therapy including effect on spermatogenesis.  Wants to proceed with Testosterone replacement therapy: In office testosterone injections. Testosterone cypionate 250 mg given today. Will check repeat labs: Total testosterone, hemoglobin and hematocrit after 4 injections.  Return to office in 2 weeks or sooner if any issues.

## 2024-05-16 ENCOUNTER — APPOINTMENT (OUTPATIENT)
Dept: UROLOGY | Facility: CLINIC | Age: 59
End: 2024-05-16
Payer: COMMERCIAL

## 2024-05-16 RX ORDER — TESTOSTERONE CYPIONATE 200 MG/ML
200 INJECTION, SOLUTION INTRAMUSCULAR
Qty: 1.25 | Refills: 0 | Status: COMPLETED | OUTPATIENT
Start: 2024-05-16 | End: 2024-05-16

## 2024-05-16 RX ORDER — TESTOSTERONE CYPIONATE 200 MG/ML
200 INJECTION, SOLUTION INTRAMUSCULAR
Refills: 0 | Status: COMPLETED | OUTPATIENT
Start: 2024-05-16

## 2024-05-16 RX ADMIN — TESTOSTERONE CYPIONATE 0 MG/ML: 200 INJECTION INTRAMUSCULAR at 00:00

## 2024-05-29 ENCOUNTER — NON-APPOINTMENT (OUTPATIENT)
Age: 59
End: 2024-05-29

## 2024-05-29 ENCOUNTER — APPOINTMENT (OUTPATIENT)
Dept: UROLOGY | Facility: CLINIC | Age: 59
End: 2024-05-29

## 2024-06-10 ENCOUNTER — APPOINTMENT (OUTPATIENT)
Dept: CARDIOLOGY | Facility: CLINIC | Age: 59
End: 2024-06-10
Payer: COMMERCIAL

## 2024-06-10 ENCOUNTER — NON-APPOINTMENT (OUTPATIENT)
Age: 59
End: 2024-06-10

## 2024-06-10 VITALS
HEIGHT: 66 IN | SYSTOLIC BLOOD PRESSURE: 141 MMHG | OXYGEN SATURATION: 95 % | DIASTOLIC BLOOD PRESSURE: 79 MMHG | HEART RATE: 67 BPM | WEIGHT: 174 LBS | BODY MASS INDEX: 27.97 KG/M2

## 2024-06-10 DIAGNOSIS — I25.10 ATHEROSCLEROTIC HEART DISEASE OF NATIVE CORONARY ARTERY W/OUT ANGINA PECTORIS: ICD-10-CM

## 2024-06-10 DIAGNOSIS — E78.5 HYPERLIPIDEMIA, UNSPECIFIED: ICD-10-CM

## 2024-06-10 DIAGNOSIS — I10 ESSENTIAL (PRIMARY) HYPERTENSION: ICD-10-CM

## 2024-06-10 PROCEDURE — 99214 OFFICE O/P EST MOD 30 MIN: CPT | Mod: 25

## 2024-06-10 PROCEDURE — 93000 ELECTROCARDIOGRAM COMPLETE: CPT

## 2024-06-10 PROCEDURE — G2211 COMPLEX E/M VISIT ADD ON: CPT | Mod: NC

## 2024-06-10 RX ORDER — SACUBITRIL AND VALSARTAN 97; 103 MG/1; MG/1
97-103 TABLET, FILM COATED ORAL
Qty: 180 | Refills: 3 | Status: DISCONTINUED | COMMUNITY
Start: 2023-10-19 | End: 2024-06-10

## 2024-06-10 RX ORDER — AMLODIPINE BESYLATE 2.5 MG/1
2.5 TABLET ORAL DAILY
Qty: 90 | Refills: 3 | Status: ACTIVE | COMMUNITY
Start: 1900-01-01 | End: 1900-01-01

## 2024-06-10 RX ORDER — ENALAPRIL MALEATE 20 MG/1
20 TABLET ORAL TWICE DAILY
Qty: 60 | Refills: 0 | Status: ACTIVE | COMMUNITY
Start: 2019-06-21

## 2024-06-10 NOTE — CARDIOLOGY SUMMARY
[de-identified] : SR  [de-identified] : Nuclear February 2021 10 METS 8/6/23 nuc: treadmill was switch to pharm for HTN. normal SPECT [de-identified] : April 2019 Mild hypocontractility of inferior lateral wall LVEF 60% 7/24/23 EF 62 [de-identified] : 4/2019  Cardiac catheterization demonstrated  99% lesion of OM 2 treated with drug-eluting stents. Had a 50% mid LAD and 50% mid circumflex lesion. He had a non-dominant right. [Normal] : normal [No Ischemia] : no Ischemia [___] : [unfilled] [LVEF ___%] : LVEF [unfilled]% [___] : [unfilled]

## 2024-06-10 NOTE — PHYSICAL EXAM
[Well Developed] : well developed [Well Nourished] : well nourished [No Acute Distress] : no acute distress [Normal Venous Pressure] : normal venous pressure [No Carotid Bruit] : no carotid bruit [Normal S1, S2] : normal S1, S2 [No Rub] : no rub [No Gallop] : no gallop [Rhythm Regular] : regular [Clear Lung Fields] : clear lung fields [Good Air Entry] : good air entry [No Respiratory Distress] : no respiratory distress  [Soft] : abdomen soft [Non Tender] : non-tender [No Masses/organomegaly] : no masses/organomegaly [Normal Bowel Sounds] : normal bowel sounds [Normal Gait] : normal gait [No Edema] : no edema [No Cyanosis] : no cyanosis [No Clubbing] : no clubbing [No Varicosities] : no varicosities [No Rash] : no rash [No Skin Lesions] : no skin lesions [Moves all extremities] : moves all extremities [No Focal Deficits] : no focal deficits [Normal Speech] : normal speech [Alert and Oriented] : alert and oriented [Normal memory] : normal memory [de-identified] : itching  [General Appearance - Well Developed] : well developed [Normal Appearance] : normal appearance [Well Groomed] : well groomed [General Appearance - Well Nourished] : well nourished [No Deformities] : no deformities [General Appearance - In No Acute Distress] : no acute distress [Normal Conjunctiva] : the conjunctiva exhibited no abnormalities [Normal Oral Mucosa] : normal oral mucosa [No Oral Pallor] : no oral pallor [No Oral Cyanosis] : no oral cyanosis [Normal Jugular Venous A Waves Present] : normal jugular venous A waves present [Normal Jugular Venous V Waves Present] : normal jugular venous V waves present [No Jugular Venous Hatch A Waves] : no jugular venous hatch A waves [Respiration, Rhythm And Depth] : normal respiratory rhythm and effort [Exaggerated Use Of Accessory Muscles For Inspiration] : no accessory muscle use [Auscultation Breath Sounds / Voice Sounds] : lungs were clear to auscultation bilaterally [Bowel Sounds] : normal bowel sounds [Abdomen Soft] : soft [Abdomen Tenderness] : non-tender [Abnormal Walk] : normal gait [Gait - Sufficient For Exercise Testing] : the gait was sufficient for exercise testing [Nail Clubbing] : no clubbing of the fingernails [Cyanosis, Localized] : no localized cyanosis [Skin Color & Pigmentation] : normal skin color and pigmentation [Skin Turgor] : normal skin turgor [] : no rash [Oriented To Time, Place, And Person] : oriented to person, place, and time [Impaired Insight] : insight and judgment were intact [No Anxiety] : not feeling anxious [Normal Rate] : normal [Normal S1] : normal S1 [Normal S2] : normal S2 [S3] : no S3 [S4] : no S4 [No Murmur] : no murmurs heard [2+] : left 2+ [Right Carotid Bruit] : no bruit heard over the right carotid [Left Carotid Bruit] : no bruit heard over the left carotid [Right Femoral Bruit] : no bruit heard over the right femoral artery [Left Femoral Bruit] : no bruit heard over the left femoral artery [1+] : left 1+ [No Abnormalities] : the abdominal aorta was not enlarged and no bruit was heard [No Pitting Edema] : no pitting edema present

## 2024-06-10 NOTE — REVIEW OF SYSTEMS
[Joint Pain] : no joint pain [Rash] : no rash [Itching] : itching [Dizziness] : no dizziness [Depression] : no depression [Anxiety] : no anxiety [Easy Bleeding] : no tendency for easy bleeding [Easy Bruising] : no tendency for easy bruising [Negative] : Genitourinary [de-identified] : See HPI

## 2024-06-10 NOTE — DISCUSSION/SUMMARY
[FreeTextEntry1] : 58 year man with a history as listed presents for a followup cardiac evaluation.  Rusty recently had bout of depression and urinary retention. his depression has been better. he is following with .  HIs chest pain had resolved with better BP control  previously. He did not tolerate Coreg. He will continue  Toprol 50mg q12. He no longer wants to take the Entresto from an anxiety of thinking it will do something bad to him. He is more comfortable on Enalapril 20mg 12. His Bp is uncontrolled now and will add Norvasc 2.5mg Qday. He will try to maintain a BP log at home. Reducing dietary salt intake advised.  His last lipid profile was uncontrolled from stopping his statin. He has resumed the statin and Fish oil. repeat lipids in 3 months.  He has MORIS on HST. He did not start on CPAP.  Exercise and diet counseling was performed in order to reduce her future cardiovascular risk. He will followup with me in 4-5  months or sooner if necessary.   [EKG obtained to assist in diagnosis and management of assessed problem(s)] : EKG obtained to assist in diagnosis and management of assessed problem(s)

## 2024-06-10 NOTE — HISTORY OF PRESENT ILLNESS
[FreeTextEntry1] : 58-year-old  male with history of HTN, HLD, CAD s/p PCI, DM present for a followup visit.    He presents today is doing well.  Since his last visit, he beame very depressed in Feb from work issues. He had poor PO intake. Lost 20 lbs. He was started on Trazadone. he is feeling better now.  He wa having more issues with his stomach that were related to his depression and now resolving. His exertional chest pain has improved with the weight loss. He   denies any PND, orthopnea, lower extremity edema, near syncope, syncope, stroke like symptoms.  When he was anxious, he for some reason he does not want ot take the entresto again. he had stop his statin.

## 2024-06-18 ENCOUNTER — RX RENEWAL (OUTPATIENT)
Age: 59
End: 2024-06-18

## 2024-06-18 RX ORDER — ICOSAPENT ETHYL 1 G/1
1 CAPSULE ORAL
Qty: 120 | Refills: 3 | Status: ACTIVE | COMMUNITY
Start: 2023-07-14

## 2024-09-20 ENCOUNTER — NON-APPOINTMENT (OUTPATIENT)
Age: 59
End: 2024-09-20

## 2024-09-20 ENCOUNTER — APPOINTMENT (OUTPATIENT)
Dept: CARDIOLOGY | Facility: CLINIC | Age: 59
End: 2024-09-20
Payer: MEDICAID

## 2024-09-20 VITALS
BODY MASS INDEX: 28.61 KG/M2 | HEIGHT: 66 IN | OXYGEN SATURATION: 98 % | WEIGHT: 178 LBS | HEART RATE: 85 BPM | DIASTOLIC BLOOD PRESSURE: 79 MMHG | SYSTOLIC BLOOD PRESSURE: 137 MMHG

## 2024-09-20 VITALS — SYSTOLIC BLOOD PRESSURE: 122 MMHG | DIASTOLIC BLOOD PRESSURE: 70 MMHG

## 2024-09-20 DIAGNOSIS — I25.10 ATHEROSCLEROTIC HEART DISEASE OF NATIVE CORONARY ARTERY W/OUT ANGINA PECTORIS: ICD-10-CM

## 2024-09-20 DIAGNOSIS — E78.5 HYPERLIPIDEMIA, UNSPECIFIED: ICD-10-CM

## 2024-09-20 DIAGNOSIS — I10 ESSENTIAL (PRIMARY) HYPERTENSION: ICD-10-CM

## 2024-09-20 PROCEDURE — 99214 OFFICE O/P EST MOD 30 MIN: CPT | Mod: 25

## 2024-09-20 PROCEDURE — G2211 COMPLEX E/M VISIT ADD ON: CPT | Mod: NC

## 2024-09-20 PROCEDURE — 93000 ELECTROCARDIOGRAM COMPLETE: CPT

## 2024-09-20 NOTE — PHYSICAL EXAM
[Well Developed] : well developed [Well Nourished] : well nourished [No Acute Distress] : no acute distress [Normal Venous Pressure] : normal venous pressure [No Carotid Bruit] : no carotid bruit [Normal S1, S2] : normal S1, S2 [No Rub] : no rub [No Gallop] : no gallop [Rhythm Regular] : regular [Clear Lung Fields] : clear lung fields [Good Air Entry] : good air entry [No Respiratory Distress] : no respiratory distress  [Soft] : abdomen soft [Non Tender] : non-tender [No Masses/organomegaly] : no masses/organomegaly [Normal Bowel Sounds] : normal bowel sounds [Normal Gait] : normal gait [No Edema] : no edema [No Cyanosis] : no cyanosis [No Clubbing] : no clubbing [No Varicosities] : no varicosities [No Rash] : no rash [No Skin Lesions] : no skin lesions [Moves all extremities] : moves all extremities [No Focal Deficits] : no focal deficits [Normal Speech] : normal speech [Alert and Oriented] : alert and oriented [Normal memory] : normal memory [de-identified] : itching  [General Appearance - Well Developed] : well developed [Normal Appearance] : normal appearance [Well Groomed] : well groomed [General Appearance - Well Nourished] : well nourished [No Deformities] : no deformities [General Appearance - In No Acute Distress] : no acute distress [Normal Conjunctiva] : the conjunctiva exhibited no abnormalities [Normal Oral Mucosa] : normal oral mucosa [No Oral Pallor] : no oral pallor [No Oral Cyanosis] : no oral cyanosis [Normal Jugular Venous A Waves Present] : normal jugular venous A waves present [Normal Jugular Venous V Waves Present] : normal jugular venous V waves present [No Jugular Venous Hatch A Waves] : no jugular venous hatch A waves [Respiration, Rhythm And Depth] : normal respiratory rhythm and effort [Exaggerated Use Of Accessory Muscles For Inspiration] : no accessory muscle use [Auscultation Breath Sounds / Voice Sounds] : lungs were clear to auscultation bilaterally [Bowel Sounds] : normal bowel sounds [Abdomen Soft] : soft [Abdomen Tenderness] : non-tender [Abnormal Walk] : normal gait [Gait - Sufficient For Exercise Testing] : the gait was sufficient for exercise testing [Nail Clubbing] : no clubbing of the fingernails [Cyanosis, Localized] : no localized cyanosis [Skin Color & Pigmentation] : normal skin color and pigmentation [Skin Turgor] : normal skin turgor [] : no rash [Oriented To Time, Place, And Person] : oriented to person, place, and time [Impaired Insight] : insight and judgment were intact [No Anxiety] : not feeling anxious [Normal Rate] : normal [Normal S1] : normal S1 [Normal S2] : normal S2 [S3] : no S3 [S4] : no S4 [No Murmur] : no murmurs heard [2+] : left 2+ [Right Carotid Bruit] : no bruit heard over the right carotid [Left Carotid Bruit] : no bruit heard over the left carotid [Right Femoral Bruit] : no bruit heard over the right femoral artery [Left Femoral Bruit] : no bruit heard over the left femoral artery [1+] : left 1+ [No Abnormalities] : the abdominal aorta was not enlarged and no bruit was heard [No Pitting Edema] : no pitting edema present

## 2024-09-20 NOTE — DISCUSSION/SUMMARY
[FreeTextEntry1] : 58 year man with a history as listed presents for a followup cardiac evaluation.  Rusty is doing better. He denies any anginal symptoms. Clinically he is euvolemic on exam. His EKG did not reveal any significant ischemic changes. .cotn Plavix for his PCI history.  His bp has improved. He did not tolerate Coreg. He will continue  Toprol 50mg q12. He no longer wants to take the Entresto from an anxiety of thinking it will do something bad to him. He is more comfortable on Enalapril 20mg 12. He will continue Norvasc 2.5mg Qday. He will try to maintain a BP log at home. Reducing dietary salt intake advised.  His last lipid profile was uncontrolled from stopping his statin. He has not been compliant with the statin. He will try to resume it. He will continue Fish oil. repeat lipids in 3 months.  He has MORIS on HST. He did not start on CPAP.  FU with  for BPH Exercise and diet counseling was performed in order to reduce her future cardiovascular risk. He will followup with me in 6 months or sooner if necessary.   [EKG obtained to assist in diagnosis and management of assessed problem(s)] : EKG obtained to assist in diagnosis and management of assessed problem(s)

## 2024-09-20 NOTE — HISTORY OF PRESENT ILLNESS
[FreeTextEntry1] : 58-year-old  male with history of HTN, HLD, CAD s/p PCI, DM present for a followup visit.    He presents today is doing well.  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 He is more stressed.  He is taking the trazadone. Feels less depressed. His exertional chest pain has improved with the weight loss. He   denies any dyspnea, PND, orthopnea, lower extremity edema, near syncope, syncope, stroke like symptoms.  Medication reconciliation performed. He is compliant with his medications.

## 2024-09-20 NOTE — REVIEW OF SYSTEMS
[Joint Pain] : no joint pain [Rash] : no rash [Itching] : itching [Dizziness] : no dizziness [Depression] : no depression [Anxiety] : no anxiety [Easy Bleeding] : no tendency for easy bleeding [Easy Bruising] : no tendency for easy bruising [Negative] : Genitourinary [de-identified] : See HPI

## 2024-09-20 NOTE — CARDIOLOGY SUMMARY
[de-identified] : SR  [de-identified] : Nuclear February 2021 10 METS 8/6/23 nuc: treadmill was switch to pharm for HTN. normal SPECT [de-identified] : 4/2019  Cardiac catheterization demonstrated  99% lesion of OM 2 treated with drug-eluting stents. Had a 50% mid LAD and 50% mid circumflex lesion. He had a non-dominant right. [de-identified] : April 2019 Mild hypocontractility of inferior lateral wall LVEF 60% 7/24/23 EF 62 [Normal] : normal [No Ischemia] : no Ischemia [___] : [unfilled] [LVEF ___%] : LVEF [unfilled]%

## 2024-10-10 ENCOUNTER — APPOINTMENT (OUTPATIENT)
Dept: UROLOGY | Facility: CLINIC | Age: 59
End: 2024-10-10

## 2024-10-10 VITALS
WEIGHT: 175 LBS | HEART RATE: 84 BPM | RESPIRATION RATE: 16 BRPM | SYSTOLIC BLOOD PRESSURE: 147 MMHG | BODY MASS INDEX: 28.12 KG/M2 | DIASTOLIC BLOOD PRESSURE: 76 MMHG | OXYGEN SATURATION: 100 % | HEIGHT: 66 IN

## 2024-10-10 DIAGNOSIS — Z87.440 PERSONAL HISTORY OF URINARY (TRACT) INFECTIONS: ICD-10-CM

## 2024-10-10 DIAGNOSIS — N40.1 BENIGN PROSTATIC HYPERPLASIA WITH LOWER URINARY TRACT SYMPMS: ICD-10-CM

## 2024-10-10 DIAGNOSIS — R33.9 RETENTION OF URINE, UNSPECIFIED: ICD-10-CM

## 2024-10-10 DIAGNOSIS — R97.20 ELEVATED PROSTATE, SPECIFIC ANTIGEN [PSA]: ICD-10-CM

## 2024-10-10 DIAGNOSIS — N13.8 BENIGN PROSTATIC HYPERPLASIA WITH LOWER URINARY TRACT SYMPMS: ICD-10-CM

## 2024-10-10 DIAGNOSIS — E29.1 TESTICULAR HYPOFUNCTION: ICD-10-CM

## 2024-10-10 PROCEDURE — 99214 OFFICE O/P EST MOD 30 MIN: CPT | Mod: 25

## 2024-10-10 PROCEDURE — 51741 ELECTRO-UROFLOWMETRY FIRST: CPT

## 2024-10-11 LAB
APPEARANCE: CLEAR
BACTERIA: NEGATIVE /HPF
BILIRUBIN URINE: NEGATIVE
BLOOD URINE: NEGATIVE
CAST: 1 /LPF
COLOR: NORMAL
EPITHELIAL CELLS: 0 /HPF
GLUCOSE QUALITATIVE U: NEGATIVE MG/DL
KETONES URINE: NEGATIVE MG/DL
LEUKOCYTE ESTERASE URINE: ABNORMAL
MICROSCOPIC-UA: NORMAL
NITRITE URINE: NEGATIVE
PH URINE: 7
PROTEIN URINE: NEGATIVE MG/DL
RED BLOOD CELLS URINE: 0 /HPF
SPECIFIC GRAVITY URINE: 1.01
UROBILINOGEN URINE: 0.2 MG/DL
WHITE BLOOD CELLS URINE: 0 /HPF

## 2024-10-13 LAB — BACTERIA UR CULT: NORMAL

## 2024-10-20 PROBLEM — Z87.440 RECENT URINARY TRACT INFECTION: Status: ACTIVE | Noted: 2024-10-20

## 2024-10-22 ENCOUNTER — TRANSCRIPTION ENCOUNTER (OUTPATIENT)
Age: 59
End: 2024-10-22

## 2024-10-31 ENCOUNTER — APPOINTMENT (OUTPATIENT)
Dept: UROLOGY | Facility: CLINIC | Age: 59
End: 2024-10-31
Payer: MEDICAID

## 2024-10-31 DIAGNOSIS — N13.8 BENIGN PROSTATIC HYPERPLASIA WITH LOWER URINARY TRACT SYMPMS: ICD-10-CM

## 2024-10-31 DIAGNOSIS — N40.1 BENIGN PROSTATIC HYPERPLASIA WITH LOWER URINARY TRACT SYMPMS: ICD-10-CM

## 2024-10-31 DIAGNOSIS — R97.20 ELEVATED PROSTATE, SPECIFIC ANTIGEN [PSA]: ICD-10-CM

## 2024-10-31 PROCEDURE — 99214 OFFICE O/P EST MOD 30 MIN: CPT

## 2024-10-31 RX ORDER — SILODOSIN 8 MG/1
8 CAPSULE ORAL DAILY
Qty: 90 | Refills: 1 | Status: ACTIVE | COMMUNITY
Start: 2024-10-31 | End: 1900-01-01

## 2024-11-05 ENCOUNTER — RX RENEWAL (OUTPATIENT)
Age: 59
End: 2024-11-05

## 2024-12-05 DIAGNOSIS — R97.20 ELEVATED PROSTATE, SPECIFIC ANTIGEN [PSA]: ICD-10-CM

## 2024-12-14 ENCOUNTER — APPOINTMENT (OUTPATIENT)
Dept: MRI IMAGING | Facility: CLINIC | Age: 59
End: 2024-12-14
Payer: MEDICAID

## 2024-12-14 ENCOUNTER — OUTPATIENT (OUTPATIENT)
Dept: OUTPATIENT SERVICES | Facility: HOSPITAL | Age: 59
LOS: 1 days | End: 2024-12-14
Payer: MEDICAID

## 2024-12-14 ENCOUNTER — RESULT REVIEW (OUTPATIENT)
Age: 59
End: 2024-12-14

## 2024-12-14 DIAGNOSIS — R97.20 ELEVATED PROSTATE SPECIFIC ANTIGEN [PSA]: ICD-10-CM

## 2024-12-14 PROCEDURE — 72197 MRI PELVIS W/O & W/DYE: CPT | Mod: 26

## 2024-12-14 PROCEDURE — 72197 MRI PELVIS W/O & W/DYE: CPT

## 2024-12-14 PROCEDURE — 76498P: CUSTOM | Mod: 26

## 2024-12-14 PROCEDURE — 76498 UNLISTED MR PROCEDURE: CPT

## 2024-12-14 PROCEDURE — A9585: CPT

## 2024-12-18 ENCOUNTER — TRANSCRIPTION ENCOUNTER (OUTPATIENT)
Age: 59
End: 2024-12-18

## 2025-03-27 ENCOUNTER — APPOINTMENT (OUTPATIENT)
Dept: CARDIOLOGY | Facility: CLINIC | Age: 60
End: 2025-03-27
Payer: MEDICAID

## 2025-03-27 ENCOUNTER — NON-APPOINTMENT (OUTPATIENT)
Age: 60
End: 2025-03-27

## 2025-03-27 VITALS
HEIGHT: 66 IN | SYSTOLIC BLOOD PRESSURE: 152 MMHG | HEART RATE: 71 BPM | OXYGEN SATURATION: 99 % | BODY MASS INDEX: 28.28 KG/M2 | WEIGHT: 176 LBS | DIASTOLIC BLOOD PRESSURE: 82 MMHG

## 2025-03-27 VITALS — SYSTOLIC BLOOD PRESSURE: 144 MMHG | DIASTOLIC BLOOD PRESSURE: 72 MMHG

## 2025-03-27 DIAGNOSIS — I25.10 ATHEROSCLEROTIC HEART DISEASE OF NATIVE CORONARY ARTERY W/OUT ANGINA PECTORIS: ICD-10-CM

## 2025-03-27 PROCEDURE — 93000 ELECTROCARDIOGRAM COMPLETE: CPT

## 2025-03-27 PROCEDURE — 99214 OFFICE O/P EST MOD 30 MIN: CPT | Mod: 25

## 2025-03-27 RX ORDER — ROSUVASTATIN CALCIUM 40 MG/1
40 TABLET, FILM COATED ORAL
Qty: 90 | Refills: 3 | Status: ACTIVE | COMMUNITY
Start: 2025-03-27 | End: 1900-01-01

## 2025-04-10 ENCOUNTER — APPOINTMENT (OUTPATIENT)
Dept: UROLOGY | Facility: CLINIC | Age: 60
End: 2025-04-10
Payer: MEDICAID

## 2025-04-10 DIAGNOSIS — R33.9 RETENTION OF URINE, UNSPECIFIED: ICD-10-CM

## 2025-04-10 DIAGNOSIS — N13.8 BENIGN PROSTATIC HYPERPLASIA WITH LOWER URINARY TRACT SYMPMS: ICD-10-CM

## 2025-04-10 DIAGNOSIS — R97.20 ELEVATED PROSTATE, SPECIFIC ANTIGEN [PSA]: ICD-10-CM

## 2025-04-10 DIAGNOSIS — N40.1 BENIGN PROSTATIC HYPERPLASIA WITH LOWER URINARY TRACT SYMPMS: ICD-10-CM

## 2025-04-10 PROCEDURE — 99214 OFFICE O/P EST MOD 30 MIN: CPT

## 2025-04-17 ENCOUNTER — APPOINTMENT (OUTPATIENT)
Dept: UROLOGY | Facility: CLINIC | Age: 60
End: 2025-04-17

## 2025-07-01 ENCOUNTER — TRANSCRIPTION ENCOUNTER (OUTPATIENT)
Age: 60
End: 2025-07-01

## 2025-08-11 ENCOUNTER — RX RENEWAL (OUTPATIENT)
Age: 60
End: 2025-08-11